# Patient Record
Sex: MALE | Race: WHITE | NOT HISPANIC OR LATINO
[De-identification: names, ages, dates, MRNs, and addresses within clinical notes are randomized per-mention and may not be internally consistent; named-entity substitution may affect disease eponyms.]

---

## 2019-11-25 ENCOUNTER — APPOINTMENT (OUTPATIENT)
Dept: HEART AND VASCULAR | Facility: CLINIC | Age: 49
End: 2019-11-25
Payer: COMMERCIAL

## 2019-11-25 ENCOUNTER — NON-APPOINTMENT (OUTPATIENT)
Age: 49
End: 2019-11-25

## 2019-11-25 VITALS
BODY MASS INDEX: 26.44 KG/M2 | SYSTOLIC BLOOD PRESSURE: 138 MMHG | DIASTOLIC BLOOD PRESSURE: 90 MMHG | HEIGHT: 74 IN | WEIGHT: 206 LBS

## 2019-11-25 DIAGNOSIS — R00.2 PALPITATIONS: ICD-10-CM

## 2019-11-25 PROCEDURE — 36415 COLL VENOUS BLD VENIPUNCTURE: CPT

## 2019-11-25 PROCEDURE — 99205 OFFICE O/P NEW HI 60 MIN: CPT | Mod: 25

## 2019-11-25 PROCEDURE — 82043 UR ALBUMIN QUANTITATIVE: CPT | Mod: QW

## 2019-11-25 PROCEDURE — 81005 URINALYSIS: CPT

## 2019-11-25 PROCEDURE — 93000 ELECTROCARDIOGRAM COMPLETE: CPT

## 2019-11-25 NOTE — HISTORY OF PRESENT ILLNESS
[FreeTextEntry1] : \par 48 y/o male with h/o hernia repair who presents for initial evaluation of palpitations and bp. \par \par no cp, sob, syncope, lh, edema, orthopnea, pnd\par \par notes palpitations on/off for past few years infrequently\par for past month notes daily palpitations\par no associated symptoms\par per wife told he snores\par \par Home bp's well controlled\par \par \par \par PMH/PSH:\par groin hernia repair 2016\par \par \par \par ALL:\par nkda\par \par \par MEDS:\par mvi\par \par \par \par SH:\par no tobacco\par social etoh\par no drugs\par \par from [place]\par \par 3 children - healthy\par \par \par \par FH:\par mother -  brain tumor 71\par father - alive, healthy 75\par 2 brothers/2 sisters - alive, healthy

## 2019-11-25 NOTE — PHYSICAL EXAM
[General Appearance - Well Developed] : well developed [General Appearance - In No Acute Distress] : no acute distress [General Appearance - Well Nourished] : well nourished [Normal Jugular Venous V Waves Present] : normal jugular venous V waves present [Normal Oropharynx] : normal oropharynx [Normal Conjunctiva] : the conjunctiva exhibited no abnormalities [Heart Rate And Rhythm] : heart rate and rhythm were normal [Heart Sounds] : normal S1 and S2 [Murmurs] : no murmurs present [Arterial Pulses Normal] : the arterial pulses were normal [Edema] : no peripheral edema present [Respiration, Rhythm And Depth] : normal respiratory rhythm and effort [Auscultation Breath Sounds / Voice Sounds] : lungs were clear to auscultation bilaterally [Bowel Sounds] : normal bowel sounds [Abdomen Tenderness] : non-tender [Abdomen Soft] : soft [Cyanosis, Localized] : no localized cyanosis [Abnormal Walk] : normal gait [Nail Clubbing] : no clubbing of the fingernails [Oriented To Time, Place, And Person] : oriented to person, place, and time [] : no rash [Skin Lesions] : no skin lesions [No Anxiety] : not feeling anxious

## 2019-11-25 NOTE — DISCUSSION/SUMMARY
[Patient] : the patient [___ Week(s)] : [unfilled] week(s) [FreeTextEntry1] : 50 y/o male with h/o hernia repair presents for initial evaluation of palpitations and bp.\par \par -ordered ekg today - nsr, normal intervals, no st/t changes\par -labs ordered today\par -counseled on cvd risk factors\par -48 hour holter monitor to placed\par -echo ordered\par -calcium score ordered today\par -bp home monitor with normal bp's, continue to follow\par -f/up 3 weeks

## 2019-11-26 LAB
ALBUMIN SERPL ELPH-MCNC: 4.5 G/DL
ALP BLD-CCNC: 84 U/L
ALT SERPL-CCNC: 14 U/L
ANION GAP SERPL CALC-SCNC: 16 MMOL/L
APPEARANCE: CLEAR
AST SERPL-CCNC: 14 U/L
BACTERIA: NEGATIVE
BASOPHILS # BLD AUTO: 0.04 K/UL
BASOPHILS NFR BLD AUTO: 0.7 %
BILIRUB SERPL-MCNC: 0.4 MG/DL
BILIRUBIN URINE: NEGATIVE
BLOOD URINE: NORMAL
BUN SERPL-MCNC: 18 MG/DL
CALCIUM SERPL-MCNC: 9.4 MG/DL
CHLORIDE SERPL-SCNC: 105 MMOL/L
CHOLEST SERPL-MCNC: 170 MG/DL
CHOLEST/HDLC SERPL: 3.8 RATIO
CO2 SERPL-SCNC: 23 MMOL/L
COLOR: YELLOW
CREAT SERPL-MCNC: 1.1 MG/DL
CREAT SPEC-SCNC: 155 MG/DL
EOSINOPHIL # BLD AUTO: 0.05 K/UL
EOSINOPHIL NFR BLD AUTO: 0.8 %
ESTIMATED AVERAGE GLUCOSE: 108 MG/DL
GLUCOSE QUALITATIVE U: NEGATIVE
GLUCOSE SERPL-MCNC: 98 MG/DL
HBA1C MFR BLD HPLC: 5.4 %
HCT VFR BLD CALC: 47.7 %
HDLC SERPL-MCNC: 45 MG/DL
HGB BLD-MCNC: 15.1 G/DL
HYALINE CASTS: 0 /LPF
IMM GRANULOCYTES NFR BLD AUTO: 0.3 %
KETONES URINE: NEGATIVE
LDLC SERPL CALC-MCNC: 100 MG/DL
LEUKOCYTE ESTERASE URINE: NEGATIVE
LYMPHOCYTES # BLD AUTO: 0.86 K/UL
LYMPHOCYTES NFR BLD AUTO: 14.6 %
MAGNESIUM SERPL-MCNC: 2.1 MG/DL
MAN DIFF?: NORMAL
MCHC RBC-ENTMCNC: 29.7 PG
MCHC RBC-ENTMCNC: 31.7 GM/DL
MCV RBC AUTO: 93.7 FL
MICROALBUMIN 24H UR DL<=1MG/L-MCNC: <1.2 MG/DL
MICROALBUMIN/CREAT 24H UR-RTO: NORMAL MG/G
MICROSCOPIC-UA: NORMAL
MONOCYTES # BLD AUTO: 0.24 K/UL
MONOCYTES NFR BLD AUTO: 4.1 %
NEUTROPHILS # BLD AUTO: 4.69 K/UL
NEUTROPHILS NFR BLD AUTO: 79.5 %
NITRITE URINE: NEGATIVE
PH URINE: 5.5
PLATELET # BLD AUTO: 231 K/UL
POTASSIUM SERPL-SCNC: 3.8 MMOL/L
PROT SERPL-MCNC: 7.4 G/DL
PROTEIN URINE: NEGATIVE
RBC # BLD: 5.09 M/UL
RBC # FLD: 13 %
RED BLOOD CELLS URINE: 7 /HPF
SODIUM SERPL-SCNC: 144 MMOL/L
SPECIFIC GRAVITY URINE: 1.02
SQUAMOUS EPITHELIAL CELLS: 0 /HPF
TRIGL SERPL-MCNC: 123 MG/DL
TSH SERPL-ACNC: 1.62 UIU/ML
UROBILINOGEN URINE: NORMAL
WBC # FLD AUTO: 5.9 K/UL
WHITE BLOOD CELLS URINE: 1 /HPF

## 2019-12-05 ENCOUNTER — FORM ENCOUNTER (OUTPATIENT)
Age: 49
End: 2019-12-05

## 2019-12-06 ENCOUNTER — OUTPATIENT (OUTPATIENT)
Dept: OUTPATIENT SERVICES | Facility: HOSPITAL | Age: 49
LOS: 1 days | End: 2019-12-06
Payer: COMMERCIAL

## 2019-12-06 ENCOUNTER — APPOINTMENT (OUTPATIENT)
Dept: CT IMAGING | Facility: HOSPITAL | Age: 49
End: 2019-12-06
Payer: COMMERCIAL

## 2019-12-06 PROCEDURE — 75571 CT HRT W/O DYE W/CA TEST: CPT

## 2019-12-06 PROCEDURE — 93306 TTE W/DOPPLER COMPLETE: CPT | Mod: 26

## 2019-12-06 PROCEDURE — 93306 TTE W/DOPPLER COMPLETE: CPT

## 2019-12-06 PROCEDURE — 75571 CT HRT W/O DYE W/CA TEST: CPT | Mod: 26

## 2019-12-18 ENCOUNTER — APPOINTMENT (OUTPATIENT)
Dept: CARDIOTHORACIC SURGERY | Facility: CLINIC | Age: 49
End: 2019-12-18
Payer: COMMERCIAL

## 2019-12-18 VITALS
TEMPERATURE: 97.8 F | DIASTOLIC BLOOD PRESSURE: 83 MMHG | OXYGEN SATURATION: 98 % | HEART RATE: 72 BPM | SYSTOLIC BLOOD PRESSURE: 134 MMHG | BODY MASS INDEX: 26.69 KG/M2 | HEIGHT: 74 IN | WEIGHT: 208 LBS | RESPIRATION RATE: 18 BRPM

## 2019-12-18 DIAGNOSIS — Z78.9 OTHER SPECIFIED HEALTH STATUS: ICD-10-CM

## 2019-12-18 PROCEDURE — 99205 OFFICE O/P NEW HI 60 MIN: CPT

## 2019-12-19 PROBLEM — Z78.9 NON-SMOKER: Status: ACTIVE | Noted: 2019-12-19

## 2019-12-19 PROBLEM — Z78.9 SOCIAL ALCOHOL USE: Status: ACTIVE | Noted: 2019-12-19

## 2019-12-19 NOTE — PHYSICAL EXAM
[General Appearance - Alert] : alert [Outer Ear] : the ears and nose were normal in appearance [General Appearance - In No Acute Distress] : in no acute distress [Sclera] : the sclera and conjunctiva were normal [] : no respiratory distress [Neck Appearance] : the appearance of the neck was normal [Apical Impulse] : the apical impulse was normal [Examination Of The Chest] : the chest was normal in appearance [2+] : right 2+ [No CVA Tenderness] : no ~M costovertebral angle tenderness [Cervical Lymph Nodes Enlarged Posterior Bilaterally] : posterior cervical [Abnormal Walk] : normal gait [Skin Color & Pigmentation] : normal skin color and pigmentation [Cranial Nerves] : cranial nerves 2-12 were intact [Deep Tendon Reflexes (DTR)] : deep tendon reflexes were 2+ and symmetric [Oriented To Time, Place, And Person] : oriented to person, place, and time

## 2019-12-30 NOTE — END OF VISIT
[FreeTextEntry3] : Scribe Attestation:\par I personally scribed for LYDIA MTZ on Dec 18 2019 10:00AM . \par \par \par \par Physician Attestation:\par Documented by CHYNA GARCIA acting as a scribe for LYDIA MTZ 12/19/2019 . \par All medical record entries made by the Scribe were at my, LYDIA MTZ  , direction and personally dictated by me on 12/18/2019 . I have reviewed the chart and agree that the record accurately reflects my personal performance of the history, physical exam, assessment and plan. \par

## 2019-12-30 NOTE — CONSULT LETTER
[Dear  ___] : Dear  [unfilled], [Sincerely,] : Sincerely, [Please see my note below.] : Please see my note below. [FreeTextEntry2] : Nu Clement MD \par [address]\par [address]\[place] [address] \par  [FreeTextEntry1] : I had the pleasure of seeing your patient, SANJUANITA HUIZAR , in my office today. We take a multidisciplinary team approach to patient care and consider you, the referring physician, an extension of our team. We will maintain an open line of communication with you throughout your patient's treatment course.  \par \par Mr. HUIZAR presents for an initial evaluation and management of thoracic aortic aneurysm. I have reviewed all of his  medical records and diagnostic images at the time of his office consultation. I have enclosed a copy for your records. \par \par I have also reviewed the indications for surgery, and used our webpage <<[email or web address]>> to illustrate the aorta and anatomy of the heart. The patient does not meet size criteria for surgical intervention at the time. \par \par Therefore, I have recommended that the patient will require a follow up appointment in 1 year with a gated CTA chest abdomen pelvis to monitor his   aortic pathology. My office will assist the patient with his upcoming appointment and I will update you on his progress at that time.\par \par I have discussed with the patient that we will continue to monitor his  aortic pathology closely at the [facility] at [facility] that encompasses the entire health care system and is one of the largest in the nation at this point.\par \par It is our commitment to provide your patient with the highest quality of advanced therapeutic options. On behalf of the Cardiothoracic Surgery Team, thank you for sending your patient to the [facility] based at [facility].  If there are any questions or concerns, please call me directly at [phone].  \par  [FreeTextEntry3] : \par \par Manuel Iglesias M.D.\par Professor of Cardiovascular and Thoracic Surgery\par Minimally Invasive Valve Surgeon\par Director of Aortic Surgery, [facility]\par Cell: [phone]\par Email: [email or web address] \par \par [facility]:\par [address], [address] Floor, [place] [address]\par Office: [phone]\par Fax: [phone]\par \par [facility]:\par Department of Cardiovascular and Thoracic Surgery\par [address] [place] [address], [address]\par Office: [phone]\par Fax: [phone]\par \par Practice Manager: Ms. Alesha Robison\par Email: [email or web address]\par Phone: [phone]\par \par

## 2019-12-30 NOTE — ASSESSMENT
[FreeTextEntry1] : 49 year old male presents for evaluation and management of aortic root aneurysm.\par \par The patient's medical records and diagnostic images were reviewed at the time of this office visit, and the following recommendation was made. Patient does not meet criteria for surgical intervention at the time and will be entered into the aortic registry surveillance program.\par Plan\par \par 1. Follow up in [facility] in 1 year with a gated CTA chest abdomen pelvis\par 2. Continue medication regimen.\par 3. Follow up with cardiologist and PCP.\par \par

## 2019-12-30 NOTE — HISTORY OF PRESENT ILLNESS
[FreeTextEntry1] : 49 year old male with palpitations presents for evaluation and management of aortic root aneurysm. Patient recently presented to Dr. Chung complaining of intermittent palpitations. Patient was put on a 48hr Holter Monitor (results in [employer]) and started on a beta blocker. Patient states palpitations have seemed to resolved once started on medications. \par \par CT coronaries and chest 12/6/19 revealed:\par Aortic root aneurysm measuring 4.9cm. normal coronaries. \par \par Echocardiogram 12/6/19:\par 1. Normal left and right ventricular size and function. \par 2. No significant valvular disease. The aortic valve is tricuspid with normal structure and function without stenosis. There is no evidence of aortic regurgitation.\par 3. No evidence of pulmonary hypertension. \par 4. Dilated aortic root at 4.3cm. \par 5. No pericardial effusion.\par \par Patient denies any fever, chills, fatigue, syncope, acute chest pain, palpitations, SOB, orthopnea, paroxysmal nocturnal dyspnea, vomiting, abdominal pain, back pain, BRBPR or swelling to legs. Patient denies any family history of aortic aneurysm or dissection.

## 2019-12-30 NOTE — PROCEDURE
[FreeTextEntry1] : Dr. Iglesias reviewed the indications for surgery, and used our webpage [email or web address] <[email or web address]> to illustrate the aorta and anatomy of the heart. Those indications are the following: size greater than 5.0 cm, symptomatic aneurysms, family history of aortic dissection or aneurysm death with a size greater than 4.5 cm, other necessary cardiac procedures such as coronary artery bypass grafting or valvular disorders with an aneurysm greater than 4.5 cm, or connective tissue disorders with an aneurysm size greater than 4.5 cm. The patient does not meet size criteria for surgical intervention at the time.\par \par Dr. Iglesias discussed activity restrictions with the patient, and would advise exercise at a moderate amount with no heavy lifting over one third of body weight, and avoiding heart rates that exceed 140 beats per minute. In addition, every patient should abstain from tobacco abuse and to avoid all illicit drug use, especially stimulants such as cocaine or methamphetamine. Dr. Iglesias also counseled regarding maintaining a healthy heart diet, and losing any excessive weight as this also put undue stress on both the aorta and entire cardiovascular system. First degree family members should be screened for bicuspid valve disease, and ascending aortic aneurysms. \par \par

## 2020-01-02 ENCOUNTER — APPOINTMENT (OUTPATIENT)
Dept: HEART AND VASCULAR | Facility: CLINIC | Age: 50
End: 2020-01-02
Payer: COMMERCIAL

## 2020-01-02 VITALS — TEMPERATURE: 98.9 F

## 2020-01-02 VITALS
HEART RATE: 69 BPM | DIASTOLIC BLOOD PRESSURE: 88 MMHG | OXYGEN SATURATION: 98 % | SYSTOLIC BLOOD PRESSURE: 122 MMHG | WEIGHT: 210 LBS | HEIGHT: 74 IN | BODY MASS INDEX: 26.95 KG/M2

## 2020-01-02 PROCEDURE — 99213 OFFICE O/P EST LOW 20 MIN: CPT

## 2020-01-02 NOTE — DISCUSSION/SUMMARY
[Patient] : the patient [___ Month(s)] : [unfilled] month(s) [FreeTextEntry1] : 50 y/o male with h/o hernia repair presents for initial evaluation of palpitations and bp.\par \par -ekg 11/19 - nsr, normal intervals, no st/t changes\par -Calcium score 12/19: zero, ao root aneurysm 4.9 cm x 4 cm\par -Echo: 12/19: normal lv size/thickness, ef 62%, no wma, trace mr/tr/pr, ao root dilated 4.3 cm\par -Holter - avg hr 68, 308 pvc's, 49 sve's\par -f/up with Dr. Iglesias for CT in 12/2020\par -labs 11/19 reviewed\par -counseled on cvd risk factors\par -continue toprol \par -bp home monitor with normal bp's, continue to follow\par -f/up 4 months\par

## 2020-01-02 NOTE — HISTORY OF PRESENT ILLNESS
[FreeTextEntry1] : 50 y/o male with h/o hernia repair, ao root aneurysm who presents for f/up of palpitations and bp. \par Last seen \par started on BB\par \par Had \par Calcium score : zero, ao root aneurysm 4.9 cm x 4 cm\par Echo: : normal lv size/thickness, ef 62%, no wma, trace mr/tr/pr, ao root dilated 4.3 cm\par Holter - avg hr 68, 308 pvc's, 49 sve's\par referred to Dr. Iglesias - \par \par no cp, sob, syncope, lh, edema, orthopnea, pnd\par \par notes palpitations on/off for past few years infrequently\par for past month notes daily palpitations\par no associated symptoms\par per wife told he snores\par \par Home bp's well controlled\par \par \par \par PMH/PSH:\par groin hernia repair \par ao root aneurysm\par \par \par \par ALL:\par nkda\par \par \par MEDS:\par mvi\par toprol 25 mg qd\par \par \par SH:\par no tobacco\par social etoh\par no drugs\par \par from [place]\par \par 3 children - healthy\par \par \par \par FH:\par mother -  brain tumor 71\par father - alive, healthy 75\par 2 brothers/2 sisters - alive, healthy \par

## 2020-06-19 ENCOUNTER — RESULT REVIEW (OUTPATIENT)
Age: 50
End: 2020-06-19

## 2020-06-24 ENCOUNTER — APPOINTMENT (OUTPATIENT)
Dept: CARDIOTHORACIC SURGERY | Facility: CLINIC | Age: 50
End: 2020-06-24
Payer: COMMERCIAL

## 2020-06-24 PROCEDURE — 99213 OFFICE O/P EST LOW 20 MIN: CPT | Mod: 95

## 2020-06-25 NOTE — REASON FOR VISIT
[Follow-Up: _____] : a [unfilled] follow-up visit [Home] : at home, [unfilled] , at the time of the visit. [Medical Office: ([facility])___] : at the medical office located in

## 2020-06-26 NOTE — END OF VISIT
[FreeTextEntry3] : I, CHYNA GARCIA , am scribing for and in the presence of LYDIA MTZ the following sections: History of present illness, past Medical/family/surgical/family/social history, review of systems, vital signs, physical exam and disposition.\par \par I personally performed the services described in the documentation, reviewed the documentation recorded by the scribe in my presence and it accurately and completely records my words and actions.\par \par

## 2020-06-26 NOTE — DATA REVIEWED
[FreeTextEntry1] : CT coronaries and chest 12/6/19 revealed:\par Aortic root aneurysm measuring 4.9cm. normal coronaries. \par \par Echocardiogram 12/6/19:\par 1. Normal left and right ventricular size and function. \par 2. No significant valvular disease. The aortic valve is tricuspid with normal structure and function without stenosis. There is no evidence of aortic regurgitation.\par 3. No evidence of pulmonary hypertension. \par 4. Dilated aortic root at 4.3cm. \par 5. No pericardial effusion.\par \par CTA 6/19/20: aortic root 4.6 cm. Normal caliber aortic arch, descending thoracic aorta and abdominal aorta. \par

## 2020-06-26 NOTE — HISTORY OF PRESENT ILLNESS
[FreeTextEntry1] : 50 year old male presents for evaluation and management of aortic root aneurysm. \par \par CTA 6/19/20: aortic root 4.6 cm. Normal caliber aortic arch, descending thoracic aorta and abdominal aorta. \par \par Patient denies any fever, chills, fatigue, syncope, acute chest pain, palpitations, SOB, orthopnea, paroxysmal nocturnal dyspnea, vomiting, abdominal pain, back pain, BRBPR or swelling to legs. Patient denies any family history of aortic aneurysm or dissection. \par

## 2020-06-26 NOTE — ASSESSMENT
[FreeTextEntry1] : 50 year old male with palpitations presents for evaluation and management of aortic root aneurysm. \par \par The patient's medical records and diagnostic images were reviewed at the time of this office visit, and the following recommendation was made. Review of the imaging shows aortic pathology has remained stable and does not require surgical intervention at this time.\par \par Plan:\par 1. Continue medication regimen\par 2. Follow up with PCP and cardiologist\par 3. BP control- I have recommended the patient to monitor his blood pressure closely. I have also advised the patient to take daily blood pressures at home and adhere to medication regimen.\par 4. Return to the [facility] in 1 year with an MRA chest\par

## 2020-06-26 NOTE — PROCEDURE
[FreeTextEntry1] : \par Dr. Iglesias discussed activity restrictions with the patient, and would advise exercise at a moderate amount with no heavy lifting over one third of body weight, and avoiding heart rates that exceed 140 beats per minute. Every patient must abstain from tobacco and illicit drug use, especially stimulants such as cocaine or methamphetamine. The patient was also counseled on maintaining a healthy heart diet, and losing any excessive weight as this also put undue stress on both the aorta and entire cardiovascular system. Patient was counseled on the importance of medication adherence for blood pressure control, as hypertension is a significant risk factor associated with aortic dissections. First degree family members should be screened for bicuspid valve disease, and ascending aortic aneurysms.\par \par Patient also was advised to view our educational video prior to this visit regarding aortic pathology, lifestyle modifications, risk factors and procedures, retrieved at [email or web address].\par \par Dr. Iglesias reviewed the indications for surgery, and used our webpage [email or web address] to illustrate the aorta and anatomy of the heart. Those indications are the following: size greater than 5.0 cm, symptomatic aneurysms, family history of aortic dissection or aneurysm death with a size greater than 4.5 cm, other necessary cardiac procedures such as coronary artery bypass grafting or severe valvular disorders with an aneurysm greater than 4.5 cm, or connective tissue disorders with an aneurysm size greater than 4.5 cm. The patient does not meet size criteria for surgical intervention at the time.

## 2020-07-16 ENCOUNTER — NON-APPOINTMENT (OUTPATIENT)
Age: 50
End: 2020-07-16

## 2020-07-16 ENCOUNTER — APPOINTMENT (OUTPATIENT)
Dept: HEART AND VASCULAR | Facility: CLINIC | Age: 50
End: 2020-07-16
Payer: COMMERCIAL

## 2020-07-16 VITALS
WEIGHT: 211 LBS | BODY MASS INDEX: 27.08 KG/M2 | HEART RATE: 59 BPM | HEIGHT: 74 IN | DIASTOLIC BLOOD PRESSURE: 82 MMHG | SYSTOLIC BLOOD PRESSURE: 130 MMHG

## 2020-07-16 VITALS — TEMPERATURE: 98.6 F

## 2020-07-16 DIAGNOSIS — I73.9 PERIPHERAL VASCULAR DISEASE, UNSPECIFIED: ICD-10-CM

## 2020-07-16 PROCEDURE — 99214 OFFICE O/P EST MOD 30 MIN: CPT | Mod: 25

## 2020-07-16 PROCEDURE — 93000 ELECTROCARDIOGRAM COMPLETE: CPT

## 2020-07-16 NOTE — HISTORY OF PRESENT ILLNESS
[FreeTextEntry1] : 51 y/o male with h/o hernia repair, ao root aneurysm, pad who presents for f/up today.\par \par Last seen \par \par Had recent CTA chest \par CTA chest : ao root 4.6 cm, minimal athero calcification of abd aorta, some athero calcification of right iliac artery\par \par started on BB\par \par Had \par Calcium score : zero, ao root aneurysm 4.9 cm x 4 cm\par Echo: : normal lv size/thickness, ef 62%, no wma, trace mr/tr/pr, ao root dilated 4.3 cm\par Holter - avg hr 68, 308 pvc's, 49 sve's\par referred to Dr. Iglesias - \par \par no cp, sob, syncope, lh, edema, orthopnea, pnd\par no further palpitations\par \par \par \par PMH/PSH:\par groin hernia repair \par ao root aneurysm\par pad\par \par \par ALL:\par nkda\par \par \par MEDS:\par mvi\par toprol 25 mg qd\par \par \par SH:\par no tobacco\par social etoh\par no drugs\par \par from [place]\par \par 3 children - healthy\par \par \par \par FH:\par mother -  brain tumor 71\par father - alive, healthy 75\par 2 brothers/2 sisters - alive, healthy \par \par

## 2020-07-16 NOTE — DISCUSSION/SUMMARY
[Patient] : the patient [___ Month(s)] : [unfilled] month(s) [FreeTextEntry1] : 49 y/o male with h/o hernia repair, ao root dilatation, pad presents for f/up today\par \par -CTA chest 6/20: ao root 4.6 cm, minimal athero calcification of abd aorta, some athero calcification of right iliac artery\par -ekg 11/19 - nsr, normal intervals, no st/t changes\par -Calcium score 12/19: zero, ao root aneurysm 4.9 cm x 4 cm\par -Echo: 12/19: normal lv size/thickness, ef 62%, no wma, trace mr/tr/pr, ao root dilated 4.3 cm\par -Holter - avg hr 68, 308 pvc's, 49 sve's\par -f/up with Dr. Iglesias for CT in 2021\par -labs 11/19 reviewed\par -counseled on cvd risk factors\par -continue toprol \par -start asa and lipitor 10 mg qhs given PAD\par -close f/up of BP\par -f/up 3 months for labs/lipids\par

## 2020-08-03 ENCOUNTER — RX RENEWAL (OUTPATIENT)
Age: 50
End: 2020-08-03

## 2020-10-15 ENCOUNTER — APPOINTMENT (OUTPATIENT)
Dept: HEART AND VASCULAR | Facility: CLINIC | Age: 50
End: 2020-10-15
Payer: COMMERCIAL

## 2020-10-15 ENCOUNTER — NON-APPOINTMENT (OUTPATIENT)
Age: 50
End: 2020-10-15

## 2020-10-15 VITALS
HEART RATE: 63 BPM | DIASTOLIC BLOOD PRESSURE: 89 MMHG | BODY MASS INDEX: 26.95 KG/M2 | SYSTOLIC BLOOD PRESSURE: 140 MMHG | HEIGHT: 74 IN | WEIGHT: 210 LBS

## 2020-10-15 VITALS — TEMPERATURE: 98.4 F

## 2020-10-15 PROCEDURE — 82043 UR ALBUMIN QUANTITATIVE: CPT | Mod: QW

## 2020-10-15 PROCEDURE — 36415 COLL VENOUS BLD VENIPUNCTURE: CPT

## 2020-10-15 PROCEDURE — 99214 OFFICE O/P EST MOD 30 MIN: CPT | Mod: 25

## 2020-10-15 PROCEDURE — 81005 URINALYSIS: CPT

## 2020-10-15 PROCEDURE — 93000 ELECTROCARDIOGRAM COMPLETE: CPT

## 2020-10-15 NOTE — DISCUSSION/SUMMARY
[Patient] : the patient [___ Week(s)] : [unfilled] week(s) [FreeTextEntry1] : 51 y/o male with h/o hernia repair, ao root dilatation, pad, htn presents for f/up today\par \par -CTA chest 6/20: ao root 4.6 cm, minimal athero calcification of abd aorta, some athero calcification of right iliac artery\par -ekg ordered today - nsr, normal intervals, no st/t changes\par -ekg  7/20- SB, normal intervals, no st/t changes\par -Calcium score 12/19: zero, ao root aneurysm 4.9 cm x 4 cm\par -Echo: 12/19: normal lv size/thickness, ef 62%, no wma, trace mr/tr/pr, ao root dilated 4.3 cm\par -Holter - avg hr 68, 308 pvc's, 49 sve's\par -f/up with Dr. Iglesias for CT in 2021\par -labs 11/19 reviewed, ordered labs today\par -counseled on cvd risk factors\par -continue toprol but increase to 50 mg qd (if needs further antihtn therapy will consider another agent in future given HR 60) goal bp 110/70 given aneurysm\par -continue asa and lipitor 10 mg qhs given PAD\par -close f/up of BP\par -f/up 3 weeks\par

## 2020-10-15 NOTE — HISTORY OF PRESENT ILLNESS
[FreeTextEntry1] : 49 y/o male with h/o hernia repair, ao root aneurysm, pad, htn who presents for f/up today.\par \par Last seen \par started asa and lipitor\par \par no cp, sob, syncope, lh, edema, orthopnea, pnd, palpitations\par \par CTA chest : ao root 4.6 cm, minimal athero calcification of abd aorta, some athero calcification of right iliac artery\par started on BB\par referred to Dr. Iglesias - \par \par Calcium score : zero, ao root aneurysm 4.9 cm x 4 cm\par Echo: : normal lv size/thickness, ef 62%, no wma, trace mr/tr/pr, ao root dilated 4.3 cm\par Holter - avg hr 68, 308 pvc's, 49 sve's\par \par \par \par \par \par PMH/PSH:\par groin hernia repair \par ao root aneurysm\par pad\par htn\par \par ALL:\par nkda\par \par \par MEDS:\par mvi\par asa 81 mg qd\par lipitor 10 mg qhs\par toprol 25 mg qd\par \par \par SH:\par no tobacco\par social etoh\par no drugs\par \par from [place]\par \par 3 children - healthy\par \par \par \par FH:\par mother -  brain tumor 71\par father - alive, healthy 75\par 2 brothers/2 sisters - alive, healthy \par

## 2020-10-19 LAB
ALBUMIN SERPL ELPH-MCNC: 4.6 G/DL
ALP BLD-CCNC: 89 U/L
ALT SERPL-CCNC: 39 U/L
ANION GAP SERPL CALC-SCNC: 12 MMOL/L
APPEARANCE: CLEAR
AST SERPL-CCNC: 23 U/L
BACTERIA: NEGATIVE
BASOPHILS # BLD AUTO: 0.03 K/UL
BASOPHILS NFR BLD AUTO: 0.6 %
BILIRUB SERPL-MCNC: 0.4 MG/DL
BILIRUBIN URINE: NEGATIVE
BLOOD URINE: NEGATIVE
BUN SERPL-MCNC: 23 MG/DL
CALCIUM SERPL-MCNC: 9.4 MG/DL
CHLORIDE SERPL-SCNC: 102 MMOL/L
CHOLEST SERPL-MCNC: 122 MG/DL
CHOLEST/HDLC SERPL: 2.9 RATIO
CO2 SERPL-SCNC: 26 MMOL/L
COLOR: NORMAL
CREAT SERPL-MCNC: 1.23 MG/DL
CREAT SPEC-SCNC: 61 MG/DL
EOSINOPHIL # BLD AUTO: 0.09 K/UL
EOSINOPHIL NFR BLD AUTO: 1.8 %
ESTIMATED AVERAGE GLUCOSE: 108 MG/DL
GLUCOSE QUALITATIVE U: NEGATIVE
GLUCOSE SERPL-MCNC: 107 MG/DL
HBA1C MFR BLD HPLC: 5.4 %
HCT VFR BLD CALC: 47.2 %
HDLC SERPL-MCNC: 42 MG/DL
HGB BLD-MCNC: 15.1 G/DL
HYALINE CASTS: 0 /LPF
IMM GRANULOCYTES NFR BLD AUTO: 0.4 %
KETONES URINE: NEGATIVE
LDLC SERPL CALC-MCNC: 57 MG/DL
LEUKOCYTE ESTERASE URINE: NEGATIVE
LYMPHOCYTES # BLD AUTO: 1.03 K/UL
LYMPHOCYTES NFR BLD AUTO: 20.9 %
MAGNESIUM SERPL-MCNC: 2.2 MG/DL
MAN DIFF?: NORMAL
MCHC RBC-ENTMCNC: 29.3 PG
MCHC RBC-ENTMCNC: 32 GM/DL
MCV RBC AUTO: 91.5 FL
MICROALBUMIN 24H UR DL<=1MG/L-MCNC: <1.2 MG/DL
MICROALBUMIN/CREAT 24H UR-RTO: NORMAL MG/G
MICROSCOPIC-UA: NORMAL
MONOCYTES # BLD AUTO: 0.23 K/UL
MONOCYTES NFR BLD AUTO: 4.7 %
NEUTROPHILS # BLD AUTO: 3.52 K/UL
NEUTROPHILS NFR BLD AUTO: 71.6 %
NITRITE URINE: NEGATIVE
PH URINE: 5.5
PLATELET # BLD AUTO: 222 K/UL
POTASSIUM SERPL-SCNC: 4.3 MMOL/L
PROT SERPL-MCNC: 7.1 G/DL
PROTEIN URINE: NEGATIVE
RBC # BLD: 5.16 M/UL
RBC # FLD: 12.9 %
RED BLOOD CELLS URINE: 1 /HPF
SARS-COV-2 IGG SERPL IA-ACNC: 0.09 INDEX
SARS-COV-2 IGG SERPL QL IA: NEGATIVE
SODIUM SERPL-SCNC: 140 MMOL/L
SPECIFIC GRAVITY URINE: 1.01
SQUAMOUS EPITHELIAL CELLS: 0 /HPF
TRIGL SERPL-MCNC: 117 MG/DL
TSH SERPL-ACNC: 1.65 UIU/ML
UROBILINOGEN URINE: NORMAL
WBC # FLD AUTO: 4.92 K/UL
WHITE BLOOD CELLS URINE: 0 /HPF

## 2021-02-12 ENCOUNTER — RX RENEWAL (OUTPATIENT)
Age: 51
End: 2021-02-12

## 2021-03-01 ENCOUNTER — APPOINTMENT (OUTPATIENT)
Dept: HEART AND VASCULAR | Facility: CLINIC | Age: 51
End: 2021-03-01
Payer: COMMERCIAL

## 2021-03-01 ENCOUNTER — NON-APPOINTMENT (OUTPATIENT)
Age: 51
End: 2021-03-01

## 2021-03-01 VITALS
TEMPERATURE: 98.2 F | DIASTOLIC BLOOD PRESSURE: 88 MMHG | HEIGHT: 74 IN | HEART RATE: 62 BPM | SYSTOLIC BLOOD PRESSURE: 126 MMHG | BODY MASS INDEX: 26.95 KG/M2 | WEIGHT: 210 LBS

## 2021-03-01 PROCEDURE — 99214 OFFICE O/P EST MOD 30 MIN: CPT | Mod: 25

## 2021-03-01 PROCEDURE — 99072 ADDL SUPL MATRL&STAF TM PHE: CPT

## 2021-03-01 NOTE — HISTORY OF PRESENT ILLNESS
[FreeTextEntry1] : 52 y/o male with h/o hernia repair, ao root aneurysm, pad, htn who presents for f/up today.\par \par Last seen 10/20\par on asa, lipitor, bb\par no cp, sob, syncope, lh, edema, orthopnea, pnd, palpitations\par \par CTA chest : ao root 4.6 cm, minimal athero calcification of abd aorta, some athero calcification of right iliac artery\par \par referred to Dr. Iglesias - next visit for \par \par Calcium score : zero, ao root aneurysm 4.9 cm x 4 cm\par Echo: : normal lv size/thickness, ef 62%, no wma, trace mr/tr/pr, ao root dilated 4.3 cm\par Holter - avg hr 68, 308 pvc's, 49 sve's\par \par \par \par \par \par PMH/PSH:\par groin hernia repair \par ao root aneurysm\par pad\par htn\par \par ALL:\par nkda\par \par \par MEDS:\par mvi\par asa 81 mg qd\par lipitor 10 mg qhs\par toprol 50 mg qd\par \par \par SH:\par no tobacco\par social etoh\par no drugs\par \par from [place]\par \par 3 children - healthy\par \par \par \par FH:\par mother -  brain tumor 71\par father - alive, healthy 75\par 2 brothers/2 sisters - alive, healthy \par \par

## 2021-03-01 NOTE — DISCUSSION/SUMMARY
[Patient] : the patient [___ Month(s)] : [unfilled] month(s) [FreeTextEntry1] : 51 y/o male with h/o hernia repair, ao root dilatation, pad, htn presents for f/up today\par \par -CTA chest 6/20: ao root 4.6 cm, minimal athero calcification of abd aorta, some athero calcification of right iliac artery\par -ekg ordered today - nsr, normal intervals, no st/t changes\par -ekg 7/20- SB, normal intervals, no st/t changes\par -Calcium score 12/19: zero, ao root aneurysm 4.9 cm x 4 cm\par -Echo: 12/19: normal lv size/thickness, ef 62%, no wma, trace mr/tr/pr, ao root dilated 4.3 cm\par -Holter - avg hr 68, 308 pvc's, 49 sve's\par -f/up with Dr. Iglesias for CT in 2021\par -labs 2020 reviewed\par -counseled on cvd risk factors\par -continue toprol  50 mg qd (if needs further antihtn therapy will consider another agent in future given HR 60) goal bp 110/70 given aneurysm\par -continue asa and lipitor 10 mg qhs given PAD\par -close f/up of BP\par -f/up 6 months for htn\par \par I have spent 30 minutes reviewing labs, records, tests and discussing medication for bp\par

## 2021-07-01 ENCOUNTER — RESULT REVIEW (OUTPATIENT)
Age: 51
End: 2021-07-01

## 2021-07-28 ENCOUNTER — APPOINTMENT (OUTPATIENT)
Dept: CARDIOTHORACIC SURGERY | Facility: CLINIC | Age: 51
End: 2021-07-28
Payer: COMMERCIAL

## 2021-07-28 DIAGNOSIS — I71.2 THORACIC AORTIC ANEURYSM, W/OUT RUPTURE: ICD-10-CM

## 2021-07-28 PROCEDURE — 99213 OFFICE O/P EST LOW 20 MIN: CPT | Mod: 95

## 2021-07-29 PROBLEM — I71.2 THORACIC AORTIC ANEURYSM, WITHOUT RUPTURE: Status: ACTIVE | Noted: 2021-07-29

## 2021-09-09 ENCOUNTER — NON-APPOINTMENT (OUTPATIENT)
Age: 51
End: 2021-09-09

## 2021-09-09 ENCOUNTER — APPOINTMENT (OUTPATIENT)
Dept: HEART AND VASCULAR | Facility: CLINIC | Age: 51
End: 2021-09-09
Payer: COMMERCIAL

## 2021-09-09 VITALS
SYSTOLIC BLOOD PRESSURE: 118 MMHG | HEIGHT: 74.5 IN | DIASTOLIC BLOOD PRESSURE: 90 MMHG | OXYGEN SATURATION: 98 % | HEART RATE: 57 BPM | WEIGHT: 218 LBS | BODY MASS INDEX: 27.68 KG/M2

## 2021-09-09 VITALS — TEMPERATURE: 97.7 F

## 2021-09-09 PROCEDURE — 99214 OFFICE O/P EST MOD 30 MIN: CPT | Mod: 25

## 2021-09-09 PROCEDURE — 93000 ELECTROCARDIOGRAM COMPLETE: CPT

## 2021-09-09 PROCEDURE — 36415 COLL VENOUS BLD VENIPUNCTURE: CPT

## 2021-09-09 NOTE — DISCUSSION/SUMMARY
[Patient] : the patient [___ Week(s)] : in [unfilled] week(s) [FreeTextEntry1] : 50 y/o male with h/o hernia repair, ao root dilatation, pad, htn presents for f/up today\par \par -start norvasc 2.5 mg qd\par -ordered ekg today - SB, normal intervals, no st/t changes\par -CTA chest 6/20: ao root 4.6 cm, minimal athero calcification of abd aorta, some athero calcification of right iliac artery\par -MRA chest 7/21: sinus of valsalva 4.0cm, ascending at RPA 2.8 cm\par -ekg 3/21 - nsr, normal intervals, no st/t changes\par -Calcium score 12/19: zero, ao root aneurysm 4.9 cm x 4 cm\par -Echo: 12/19: normal lv size/thickness, ef 62%, no wma, trace mr/tr/pr, ao root dilated 4.3 cm\par -Holter - avg hr 68, 308 pvc's, 49 sve's\par -f/up with Dr. Iglesias for CT in 2023\par -labs 2020 reviewed, labs ordered today\par -counseled on cvd risk factors\par -continue toprol 50 mg qd, goal bp 110/70 given aneurysm\par -continue asa and lipitor 10 mg qhs given PAD\par -close f/up of BP\par -f/up 2-3 weeks for htn\par \par I have spent 30 minutes reviewing labs, records, tests and discussing medication for bp\par

## 2021-09-09 NOTE — HISTORY OF PRESENT ILLNESS
[FreeTextEntry1] : 52 y/o male with h/o hernia repair, ao root aneurysm, pad, htn who presents for f/up today.\par \par Last seen 3/21\par on asa, lipitor, bb\par no cp, sob, syncope, lh, edema, orthopnea, pnd, palpitations\par \par -MRA chest : sinus of valsalva 4.0cm, ascending at RPA 2.8 cm\par CTA chest : ao root 4.6 cm, minimal athero calcification of abd aorta, some athero calcification of right iliac artery\par \par referred to Dr. Iglesias - next visit for \par \par Calcium score : zero, ao root aneurysm 4.9 cm x 4 cm\par Echo: : normal lv size/thickness, ef 62%, no wma, trace mr/tr/pr, ao root dilated 4.3 cm\par Holter - avg hr 68, 308 pvc's, 49 sve's\par \par \par \par \par \par PMH/PSH:\par groin hernia repair \par ao root aneurysm\par pad\par htn\par \par ALL:\par nkda\par \par \par MEDS:\par mvi\par asa 81 mg qd\par lipitor 10 mg qhs\par toprol 50 mg qd\par \par \par SH:\par no tobacco\par social etoh\par no drugs\par \par from [place]\par \par 3 children - healthy\par \par \par \par FH:\par mother -  brain tumor 71\par father - alive, healthy 75\par 2 brothers/2 sisters - alive, healthy \par

## 2021-09-13 LAB
ALBUMIN SERPL ELPH-MCNC: 4.6 G/DL
ALP BLD-CCNC: 97 U/L
ALT SERPL-CCNC: 25 U/L
ANION GAP SERPL CALC-SCNC: 9 MMOL/L
APPEARANCE: CLEAR
AST SERPL-CCNC: 17 U/L
BACTERIA: NEGATIVE
BASOPHILS # BLD AUTO: 0.04 K/UL
BASOPHILS NFR BLD AUTO: 0.9 %
BILIRUB SERPL-MCNC: 0.5 MG/DL
BILIRUBIN URINE: NEGATIVE
BLOOD URINE: NORMAL
BUN SERPL-MCNC: 22 MG/DL
CALCIUM SERPL-MCNC: 9.4 MG/DL
CHLORIDE SERPL-SCNC: 102 MMOL/L
CHOLEST SERPL-MCNC: 115 MG/DL
CO2 SERPL-SCNC: 28 MMOL/L
COLOR: NORMAL
CREAT SERPL-MCNC: 1.17 MG/DL
CREAT SPEC-SCNC: 98 MG/DL
EOSINOPHIL # BLD AUTO: 0.09 K/UL
EOSINOPHIL NFR BLD AUTO: 2.1 %
ESTIMATED AVERAGE GLUCOSE: 111 MG/DL
GLUCOSE QUALITATIVE U: NEGATIVE
GLUCOSE SERPL-MCNC: 100 MG/DL
HBA1C MFR BLD HPLC: 5.5 %
HCT VFR BLD CALC: 47.8 %
HDLC SERPL-MCNC: 43 MG/DL
HGB BLD-MCNC: 15.2 G/DL
HYALINE CASTS: 0 /LPF
IMM GRANULOCYTES NFR BLD AUTO: 0.5 %
KETONES URINE: NEGATIVE
LDLC SERPL CALC-MCNC: 59 MG/DL
LEUKOCYTE ESTERASE URINE: NEGATIVE
LYMPHOCYTES # BLD AUTO: 0.96 K/UL
LYMPHOCYTES NFR BLD AUTO: 22.1 %
MAGNESIUM SERPL-MCNC: 2.2 MG/DL
MAN DIFF?: NORMAL
MCHC RBC-ENTMCNC: 29.1 PG
MCHC RBC-ENTMCNC: 31.8 GM/DL
MCV RBC AUTO: 91.6 FL
MICROALBUMIN 24H UR DL<=1MG/L-MCNC: <1.2 MG/DL
MICROALBUMIN/CREAT 24H UR-RTO: NORMAL MG/G
MICROSCOPIC-UA: NORMAL
MONOCYTES # BLD AUTO: 0.22 K/UL
MONOCYTES NFR BLD AUTO: 5.1 %
NEUTROPHILS # BLD AUTO: 3.02 K/UL
NEUTROPHILS NFR BLD AUTO: 69.3 %
NITRITE URINE: NEGATIVE
NONHDLC SERPL-MCNC: 72 MG/DL
PH URINE: 5.5
PLATELET # BLD AUTO: 217 K/UL
POTASSIUM SERPL-SCNC: 4.6 MMOL/L
PROT SERPL-MCNC: 7.3 G/DL
PROTEIN URINE: NEGATIVE
RBC # BLD: 5.22 M/UL
RBC # FLD: 13 %
RED BLOOD CELLS URINE: 1 /HPF
SODIUM SERPL-SCNC: 139 MMOL/L
SPECIFIC GRAVITY URINE: 1.02
SQUAMOUS EPITHELIAL CELLS: 0 /HPF
TRIGL SERPL-MCNC: 65 MG/DL
TSH SERPL-ACNC: 1.47 UIU/ML
UROBILINOGEN URINE: NORMAL
WBC # FLD AUTO: 4.35 K/UL
WHITE BLOOD CELLS URINE: 0 /HPF

## 2021-09-30 ENCOUNTER — APPOINTMENT (OUTPATIENT)
Dept: HEART AND VASCULAR | Facility: CLINIC | Age: 51
End: 2021-09-30
Payer: COMMERCIAL

## 2021-09-30 VITALS
SYSTOLIC BLOOD PRESSURE: 130 MMHG | HEIGHT: 74.5 IN | WEIGHT: 218 LBS | TEMPERATURE: 96.7 F | HEART RATE: 60 BPM | BODY MASS INDEX: 27.68 KG/M2 | DIASTOLIC BLOOD PRESSURE: 84 MMHG

## 2021-09-30 VITALS — SYSTOLIC BLOOD PRESSURE: 111 MMHG | DIASTOLIC BLOOD PRESSURE: 79 MMHG

## 2021-09-30 PROCEDURE — 99214 OFFICE O/P EST MOD 30 MIN: CPT

## 2021-09-30 NOTE — DISCUSSION/SUMMARY
[Patient] : the patient [___ Month(s)] : in [unfilled] month(s) [FreeTextEntry1] : 50 y/o male with h/o hernia repair, ao root dilatation, pad, htn presents for f/up today\par \par -continue norvasc 2.5 mg qd\par -ekg 9/21 - SB, normal intervals, no st/t changes\par -CTA chest 6/20: ao root 4.6 cm, minimal athero calcification of abd aorta, some athero calcification of right iliac artery\par -MRA chest 7/21: sinus of valsalva 4.0cm, ascending at RPA 2.8 cm\par -ekg 3/21 - nsr, normal intervals, no st/t changes\par -Calcium score 12/19: zero, ao root aneurysm 4.9 cm x 4 cm\par -Echo: 12/19: normal lv size/thickness, ef 62%, no wma, trace mr/tr/pr, ao root dilated 4.3 cm\par -Holter - avg hr 68, 308 pvc's, 49 sve's\par -f/up with Dr. Iglesias for CT in 2023\par -labs 2021 reviewed \par -counseled on cvd risk factors\par -continue toprol 50 mg qd, goal bp 110/70 given aneurysm\par -continue asa and lipitor 10 mg qhs given PAD\par -close f/up of BP\par -f/up 6 months for htn\par \par I have spent 30 minutes reviewing labs, records, tests and discussing medication for bp

## 2021-09-30 NOTE — HISTORY OF PRESENT ILLNESS
[FreeTextEntry1] : \par 50 y/o male with h/o hernia repair, ao root aneurysm, pad, htn who presents for f/up today.\par \par Last seen \par started on norvasc 2.5 mg qd, tolerating well\par on asa, lipitor, bb\par no cp, sob, syncope, lh, edema, orthopnea, pnd, palpitations\par \par -MRA chest : sinus of valsalva 4.0cm, ascending at RPA 2.8 cm\par CTA chest : ao root 4.6 cm, minimal athero calcification of abd aorta, some athero calcification of right iliac artery\par \par referred to Dr. Iglesias - next visit for \par \par Calcium score : zero, ao root aneurysm 4.9 cm x 4 cm\par Echo: : normal lv size/thickness, ef 62%, no wma, trace mr/tr/pr, ao root dilated 4.3 cm\par Holter - avg hr 68, 308 pvc's, 49 sve's\par \par \par \par \par \par PMH/PSH:\par groin hernia repair \par ao root aneurysm\par pad\par htn\par \par ALL:\par nkda\par \par \par MEDS:\par norvasc 2.5 mg qd\par mvi\par asa 81 mg qd\par lipitor 10 mg qhs\par toprol 50 mg qd\par \par \par SH:\par no tobacco\par social etoh\par no drugs\par \par from [place]\par \par 3 children - healthy\par \par \par \par FH:\par mother -  brain tumor 71\par father - alive, healthy 75\par 2 brothers/2 sisters - alive, healthy \par \par

## 2022-01-01 NOTE — PHYSICAL EXAM
INFANT DISCHARGE SUMMARY :    DISPOSITION STATUS NOTE:  Date:2022 Mode: Car Seat Accompanied by:family member Disposition:home  Outcomes achieved per plan of care. Discharged in stable condition. Family aware of [place] state law requiring infants to ride in car safety seats and that there are fines for those who disregard this law.    Birth Weight: 7 lb 15 oz (3600 g)   Discharge Weight: Weight: Weight: 3490 g (05/10/22 1124) -3%  TCB:4.2 (Low) (05/10/22 1124)  Bilirubin No results found for: BILIRUBIN  [place] Screen: Done   [place] Hearing Test Results: Done  Hearing Test Machine: Auditory Brainstem Response (Algo) (05/10/22 1132)   Hearing Test Results: Pass R, Pass L (05/10/22 1132)     Immunizations:   Most Recent Immunizations  Administered Date(s) Administered  • Hepatitis B Child   Most Recent Immunizations   Administered Date(s) Administered   • Hep B, adolescent or pediatric 2022   Pended Date(s) Pended   • Hepatitis B Immune Globulin 2022          Safe Sleep: Reduce your baby's risk of SIDS (Sudden Infant Death Syndrome) by practicing Safe Sleep during naps and at night  -Always place your baby on his/her back in a safety approved crib, bassinet, or portable play area.  DO NOT use a car seat, adult bed, swing, carrier, or similar products for everyday sleep.  -Infants should not share a bed with adults or other children during sleep, the safest place for your baby is in a separate safe sleep space close to the parents' bed.  -Place your baby firm surface covered with a fitted sheet, NEVER on a couch (alone or with a parent), pillow, quilt, or with fluffy materials.  -No pillows, stuffed animals, toys, or crib bumpers.     Car seat Safety:   -It is recommended that children under the age of two should always be in a rear-facing seat that is installed in the back seat.  -Proper fit: Harness straps should lie flat and be snug when clipped.  The  Discharge Call Back    Attempted to contact patient with no answer. Message left.     chest clip should be at armpit level.  Adjust as your baby grows.    Feeding your baby:  - Feed only breast milk or formula to infant unless your doctor directs otherwise (No water, cereal, honey etc).  -Your baby should be fed on cue (wakes up, sucking on hands, turning head with mouth open, and/or then cries).  Your baby will eat every 2-3 hours day and night, or 8-12 times in 24 hours.  Your baby will let you know he/she is full when they detach off nipple (mother's or a bottle), suck less vigorously, or becomes sleepy and relaxed.  -Keep a record of your baby's feedings and diapers just like you did in the hospital until the baby is seen by the pediatrician.    For breastfeeding tips please refer to pg. 32 of the breastfeeding section in your A New Beginning booklet given to you by your caregivers.    Tummy time:  Allow your alert and awake baby to have 30-60 minutes of supervised tummy time each day.    Bathing your Baby:  Babies have sensitive skin that can dry out easily.  Wipe your baby's face with just warm water.  Wash his/her body with a soapy wash cloth until umbilical cord falls off and/or circumcision heals (about 1 week of age) then you can bathe the baby in a tub.    Umbilical Cord:  Keep umbilical cord dry.  Do not apply any medications or alcohol to site.  The cord will fall off in about 1 week.    Circumcision Care:  Wash penis with only warm water.  Avoid wiping with baby wipes as they could sting.  Apply a quarter size of petroleum jelly in your baby's diaper with every diaper change to prevent the sore area of the penis from sticking to the diaper.    Jaundice:    Yellowing of the skin or \"jaundice\" is common in newborns.  The yellow appearance is most noticeable in the eyes and skin and is caused by bilirubin.  Jaundice is normal but can become dangerous if the level of bilirubin is too high.  Your baby's doctor will monitor your infant's bilirubin level and treat it as necessary.  You may  have to bring your infant to the pediatrician for a blood test if bilirubin was high in the hospital.  Call your pediatrician if your baby's skin or eyes become yellow or your baby becomes lethargic (too sleepy).     Safety at Home:    Accidental infant fall can happen; the key to avoid a fall is prevention.  Remember as you are recovering the medications you may be taking may make you more tired.  Keep this in mind when holding your baby.  If you are feeling sleepy or plan on sleeping place your baby in a safe place such as their crib or bassinet.   Babies wiggle, move, and push against things with their feet.  Even these early movements can result in a fall.  Do not leave your baby alone on a changing table, bed, sofa, or chair.  If you cannot hold your baby put them in a safe place such as their crib or playpen.  If you have other children, please be cautious and assist your older children while they are holding baby.  If your child has a serious fall or does not act normally after a fall, call your doctor.    Follow-Up Care:   Please follow-up as scheduled.  Call your doctor or lactation consultant with any questions or concerns.    DO NOT GIVE BABY ANY MEDICATIONS unless directed by your doctor.    Notify your doctor if:  · Fever of 100.0 degrees F or higher axially (under the armpit)  · Constant crying for no apparent reason  · Forceful vomiting (not spitting up)  · Several feedings when infant does not suck  · Redness, bleeding or foul-smelling drainage from around the umbilical cord  · Any unusual rash  · Yellow color of eyes or skin  · Watery runny stools (mucous or blood or foul odor)  · No stool for 48 hours  · Has less than 1 wet diaper per day of life (for example you should see 2 wet diapers on day 2, 3 wet diapers on day 3, etc.)  Babies should have at least 6 wet diapers a day after the baby is 7 days old  · Infant injury (fall from bed or table, dropped or severely shaken)    Special Instructions:  In case you need to call about any of the above:  Take baby's temperature and write it down  Know how much and how many feedings the infant had that day  Note amount, color, consistency of urine and stools  Note any changes in the infant's behavior such as being sleepy, very fussy or less active.   [General Appearance - Well Developed] : well developed [General Appearance - Well Nourished] : well nourished [General Appearance - In No Acute Distress] : no acute distress

## 2022-03-31 ENCOUNTER — APPOINTMENT (OUTPATIENT)
Dept: HEART AND VASCULAR | Facility: CLINIC | Age: 52
End: 2022-03-31
Payer: COMMERCIAL

## 2022-03-31 VITALS
HEART RATE: 72 BPM | TEMPERATURE: 97.4 F | HEIGHT: 74.5 IN | OXYGEN SATURATION: 99 % | DIASTOLIC BLOOD PRESSURE: 80 MMHG | SYSTOLIC BLOOD PRESSURE: 122 MMHG | WEIGHT: 215 LBS | BODY MASS INDEX: 27.3 KG/M2

## 2022-03-31 PROCEDURE — 93000 ELECTROCARDIOGRAM COMPLETE: CPT

## 2022-03-31 PROCEDURE — 99214 OFFICE O/P EST MOD 30 MIN: CPT | Mod: 25

## 2022-03-31 RX ORDER — ASPIRIN ENTERIC COATED TABLETS 81 MG 81 MG/1
81 TABLET, DELAYED RELEASE ORAL DAILY
Qty: 90 | Refills: 3 | Status: ACTIVE | COMMUNITY
Start: 2020-07-16 | End: 1900-01-01

## 2022-03-31 NOTE — HISTORY OF PRESENT ILLNESS
[FreeTextEntry1] : 51 y/o male with h/o hernia repair, ao root aneurysm, pad, htn who presents for f/up today.\par \par Last seen \par  \par on asa, lipitor, bb, norvasc\par no cp, sob, syncope, lh, edema, orthopnea, pnd, palpitations\par \par -MRA chest : sinus of valsalva 4.0cm, ascending at RPA 2.8 cm\par CTA chest : ao root 4.6 cm, minimal athero calcification of abd aorta, some athero calcification of right iliac artery\par \par referred to Dr. Iglesias - next visit for \par \par Calcium score : zero, ao root aneurysm 4.9 cm x 4 cm\par Echo: : normal lv size/thickness, ef 62%, no wma, trace mr/tr/pr, ao root dilated 4.3 cm\par Holter - avg hr 68, 308 pvc's, 49 sve's\par \par \par \par \par \par PMH/PSH:\par groin hernia repair \par ao root aneurysm\par pad\par htn\par \par ALL:\par nkda\par \par \par MEDS:\par norvasc 2.5 mg qd\par mvi\par asa 81 mg qd\par lipitor 10 mg qhs\par toprol 50 mg qd\par \par \par SH:\par no tobacco\par social etoh\par no drugs\par \par from [place]\par \par 3 children - healthy\par \par \par \par FH:\par mother -  brain tumor 71\par father - alive, healthy 75\par 2 brothers/2 sisters - alive, healthy \par \par

## 2022-03-31 NOTE — DISCUSSION/SUMMARY
[Patient] : the patient [___ Month(s)] : in [unfilled] month(s) [FreeTextEntry1] : 51 y/o male with h/o hernia repair, ao root dilatation, pad, htn presents for f/up today\par \par -continue norvasc 2.5 mg qd\par -CTA chest 6/20: ao root 4.6 cm, minimal athero calcification of abd aorta, some athero calcification of right iliac artery\par -MRA chest 7/21: sinus of valsalva 4.0cm, ascending at RPA 2.8 cm\par -ekg ordered today - SB, normal intervals, no st/t changes\par -Calcium score 12/19: zero, ao root aneurysm 4.9 cm x 4 cm\par -Echo: 12/19: normal lv size/thickness, ef 62%, no wma, trace mr/tr/pr, ao root dilated 4.3 cm\par -Holter - avg hr 68, 308 pvc's, 49 sve's\par -f/up with Dr. Iglesias for CT in 2023\par -labs 2021 reviewed \par -counseled on cvd risk factors\par -continue toprol 50 mg qd, goal bp 110/70 given aneurysm\par -continue asa and lipitor 10 mg qhs given PAD\par -close f/up of BP\par -f/up 6 months for htn\par \par I have spent 30 minutes reviewing labs, records, tests and discussing medication for bp

## 2022-10-06 ENCOUNTER — NON-APPOINTMENT (OUTPATIENT)
Age: 52
End: 2022-10-06

## 2022-10-06 ENCOUNTER — APPOINTMENT (OUTPATIENT)
Dept: HEART AND VASCULAR | Facility: CLINIC | Age: 52
End: 2022-10-06

## 2022-10-06 ENCOUNTER — TRANSCRIPTION ENCOUNTER (OUTPATIENT)
Age: 52
End: 2022-10-06

## 2022-10-06 VITALS
HEART RATE: 68 BPM | HEIGHT: 74 IN | OXYGEN SATURATION: 96 % | BODY MASS INDEX: 26.95 KG/M2 | TEMPERATURE: 97.3 F | DIASTOLIC BLOOD PRESSURE: 88 MMHG | WEIGHT: 210 LBS | SYSTOLIC BLOOD PRESSURE: 129 MMHG

## 2022-10-06 VITALS — DIASTOLIC BLOOD PRESSURE: 76 MMHG | SYSTOLIC BLOOD PRESSURE: 124 MMHG

## 2022-10-06 PROCEDURE — 36415 COLL VENOUS BLD VENIPUNCTURE: CPT

## 2022-10-06 PROCEDURE — 99214 OFFICE O/P EST MOD 30 MIN: CPT | Mod: 25

## 2022-10-06 PROCEDURE — 93000 ELECTROCARDIOGRAM COMPLETE: CPT

## 2022-10-06 NOTE — HISTORY OF PRESENT ILLNESS
[FreeTextEntry1] : 51 y/o male with h/o hernia repair, ao root aneurysm, pad, htn who presents for f/up today.\par \par Last seen 3/22\par  \par on asa, lipitor, bb, norvasc\par no cp, sob, syncope, lh, edema, orthopnea, pnd, palpitations\par \par -MRA chest : sinus of valsalva 4.0cm, ascending at RPA 2.8 cm\par CTA chest : ao root 4.6 cm, minimal athero calcification of abd aorta, some athero calcification of right iliac artery\par \par referred to Dr. Iglesias - next visit for \par \par Calcium score : zero, ao root aneurysm 4.9 cm x 4 cm\par Echo: : normal lv size/thickness, ef 62%, no wma, trace mr/tr/pr, ao root dilated 4.3 cm\par Holter - avg hr 68, 308 pvc's, 49 sve's\par \par \par \par \par \par PMH/PSH:\par groin hernia repair \par ao root aneurysm\par pad\par htn\par \par ALL:\par nkda\par \par \par MEDS:\par norvasc 2.5 mg qd\par mvi\par asa 81 mg qd\par lipitor 10 mg qhs\par toprol 50 mg qd\par \par \par SH:\par no tobacco\par social etoh\par no drugs\par \par from [place]\par \par 3 children - healthy\par \par \par \par FH:\par mother -  brain tumor 71\par father - alive, healthy 75\par 2 brothers/2 sisters - alive, healthy \par

## 2022-10-06 NOTE — PHYSICAL EXAM
[Well Developed] : well developed [Well Nourished] : well nourished [No Acute Distress] : no acute distress [Normal S1, S2] : normal S1, S2 [No Murmur] : no murmur [No Rub] : no rub [No Gallop] : no gallop [Normal] : clear lung fields, good air entry, no respiratory distress [Clear Lung Fields] : clear lung fields [Good Air Entry] : good air entry [No Respiratory Distress] : no respiratory distress

## 2022-10-06 NOTE — DISCUSSION/SUMMARY
[Patient] : the patient [___ Month(s)] : in [unfilled] month(s) [EKG obtained to assist in diagnosis and management of assessed problem(s)] : EKG obtained to assist in diagnosis and management of assessed problem(s) [FreeTextEntry1] : 53 y/o male with h/o hernia repair, ao root dilatation, pad, htn presents for f/up today\par \par -continue norvasc 2.5 mg qd\par -CTA chest 6/20: ao root 4.6 cm, minimal athero calcification of abd aorta, some athero calcification of right iliac artery\par -MRA chest 7/21: sinus of valsalva 4.0cm, ascending at RPA 2.8 cm\par -ekg ordered today - nsr, normal intervals, no st/t changes\par -Calcium score 12/19: zero, ao root aneurysm 4.9 cm x 4 cm\par -Echo: 12/19: normal lv size/thickness, ef 62%, no wma, trace mr/tr/pr, ao root dilated 4.3 cm\par -Holter - avg hr 68, 308 pvc's, 49 sve's\par -f/up with Dr. Iglesias for CT in 2023\par -labs 2021 reviewed \par -counseled on cvd risk factors\par -continue toprol 50 mg qd, goal bp 110/70 given aneurysm\par -continue asa and lipitor 10 mg qhs given PAD\par -close f/up of BP\par -f/up 6 months for htn\par \par I have spent 30 minutes reviewing labs, records, tests and discussing medication for bp. \par \par

## 2022-10-07 LAB
ALBUMIN SERPL ELPH-MCNC: 4.6 G/DL
ALP BLD-CCNC: 93 U/L
ALT SERPL-CCNC: 19 U/L
ANION GAP SERPL CALC-SCNC: 10 MMOL/L
APPEARANCE: CLEAR
AST SERPL-CCNC: 15 U/L
BACTERIA: NEGATIVE
BASOPHILS # BLD AUTO: 0.04 K/UL
BASOPHILS NFR BLD AUTO: 1 %
BILIRUB SERPL-MCNC: 0.3 MG/DL
BILIRUBIN URINE: NEGATIVE
BLOOD URINE: NEGATIVE
BUN SERPL-MCNC: 20 MG/DL
CALCIUM SERPL-MCNC: 9.3 MG/DL
CHLORIDE SERPL-SCNC: 105 MMOL/L
CHOLEST SERPL-MCNC: 140 MG/DL
CO2 SERPL-SCNC: 26 MMOL/L
COLOR: NORMAL
CREAT SERPL-MCNC: 1.17 MG/DL
CREAT SPEC-SCNC: 179 MG/DL
EGFR: 75 ML/MIN/1.73M2
EOSINOPHIL # BLD AUTO: 0.1 K/UL
EOSINOPHIL NFR BLD AUTO: 2.4 %
ESTIMATED AVERAGE GLUCOSE: 111 MG/DL
GLUCOSE QUALITATIVE U: NEGATIVE
GLUCOSE SERPL-MCNC: 113 MG/DL
HBA1C MFR BLD HPLC: 5.5 %
HCT VFR BLD CALC: 47.8 %
HDLC SERPL-MCNC: 52 MG/DL
HGB BLD-MCNC: 15.2 G/DL
HYALINE CASTS: 0 /LPF
IMM GRANULOCYTES NFR BLD AUTO: 0.7 %
KETONES URINE: NEGATIVE
LDLC SERPL CALC-MCNC: 77 MG/DL
LEUKOCYTE ESTERASE URINE: NEGATIVE
LYMPHOCYTES # BLD AUTO: 1 K/UL
LYMPHOCYTES NFR BLD AUTO: 23.9 %
MAGNESIUM SERPL-MCNC: 2.1 MG/DL
MAN DIFF?: NORMAL
MCHC RBC-ENTMCNC: 29.3 PG
MCHC RBC-ENTMCNC: 31.8 GM/DL
MCV RBC AUTO: 92.1 FL
MICROALBUMIN 24H UR DL<=1MG/L-MCNC: <1.2 MG/DL
MICROALBUMIN/CREAT 24H UR-RTO: NORMAL MG/G
MICROSCOPIC-UA: NORMAL
MONOCYTES # BLD AUTO: 0.24 K/UL
MONOCYTES NFR BLD AUTO: 5.7 %
NEUTROPHILS # BLD AUTO: 2.78 K/UL
NEUTROPHILS NFR BLD AUTO: 66.3 %
NITRITE URINE: NEGATIVE
NONHDLC SERPL-MCNC: 88 MG/DL
PH URINE: 6
PLATELET # BLD AUTO: 222 K/UL
POTASSIUM SERPL-SCNC: 4.5 MMOL/L
PROT SERPL-MCNC: 7.5 G/DL
PROTEIN URINE: NEGATIVE
RBC # BLD: 5.19 M/UL
RBC # FLD: 12.9 %
RED BLOOD CELLS URINE: 1 /HPF
SODIUM SERPL-SCNC: 141 MMOL/L
SPECIFIC GRAVITY URINE: 1.03
SQUAMOUS EPITHELIAL CELLS: 0 /HPF
TRIGL SERPL-MCNC: 58 MG/DL
TSH SERPL-ACNC: 1.58 UIU/ML
UROBILINOGEN URINE: NORMAL
WBC # FLD AUTO: 4.19 K/UL
WHITE BLOOD CELLS URINE: 0 /HPF

## 2023-02-01 ENCOUNTER — NON-APPOINTMENT (OUTPATIENT)
Age: 53
End: 2023-02-01

## 2023-04-25 ENCOUNTER — RX RENEWAL (OUTPATIENT)
Age: 53
End: 2023-04-25

## 2023-05-11 ENCOUNTER — APPOINTMENT (OUTPATIENT)
Dept: HEART AND VASCULAR | Facility: CLINIC | Age: 53
End: 2023-05-11
Payer: COMMERCIAL

## 2023-05-11 VITALS
SYSTOLIC BLOOD PRESSURE: 132 MMHG | OXYGEN SATURATION: 96 % | TEMPERATURE: 97.1 F | DIASTOLIC BLOOD PRESSURE: 78 MMHG | BODY MASS INDEX: 26.18 KG/M2 | HEIGHT: 74 IN | HEART RATE: 76 BPM | WEIGHT: 204 LBS

## 2023-05-11 PROCEDURE — 93000 ELECTROCARDIOGRAM COMPLETE: CPT

## 2023-05-11 PROCEDURE — 99214 OFFICE O/P EST MOD 30 MIN: CPT | Mod: 25

## 2023-05-11 NOTE — HISTORY OF PRESENT ILLNESS
[FreeTextEntry1] : 54 y/o male with h/o hernia repair, ao root aneurysm, pad, htn who presents for f/up today.\par \par Last seen 10/22\par  \par on asa, lipitor, bb, norvasc\par no cp, sob, syncope, lh, edema, orthopnea, pnd, palpitations\par \par -MRA chest : sinus of valsalva 4.0cm, ascending at RPA 2.8 cm\par CTA chest : ao root 4.6 cm, minimal athero calcification of abd aorta, some athero calcification of right iliac artery\par \par referred to Dr. Iglesias - next visit for \par \par Calcium score : zero, ao root aneurysm 4.9 cm x 4 cm\par Echo: : normal lv size/thickness, ef 62%, no wma, trace mr/tr/pr, ao root dilated 4.3 cm\par Holter - avg hr 68, 308 pvc's, 49 sve's\par \par \par \par \par \par PMH/PSH:\par groin hernia repair \par ao root aneurysm\par pad\par htn\par \par ALL:\par nkda\par \par \par MEDS:\par norvasc 2.5 mg qd\par mvi\par asa 81 mg qd\par lipitor 10 mg qhs\par toprol 50 mg qd\par \par \par SH:\par no tobacco\par social etoh\par no drugs\par \par from [place]\par \par 3 children - healthy\par \par \par \par FH:\par mother -  brain tumor 71\par father - alive, healthy 70's\par 2 brothers/2 sisters - alive, healthy \par \par

## 2023-05-11 NOTE — DISCUSSION/SUMMARY
[Patient] : the patient [___ Month(s)] : in [unfilled] month(s) [EKG obtained to assist in diagnosis and management of assessed problem(s)] : EKG obtained to assist in diagnosis and management of assessed problem(s) [FreeTextEntry1] : 52 y/o male with h/o hernia repair, ao root dilatation, pad, htn presents for f/up today\par \par -continue norvasc 2.5 mg qd\par -CTA chest 6/20: ao root 4.6 cm, minimal athero calcification of abd aorta, some athero calcification of right iliac artery\par -MRA chest 7/21: sinus of valsalva 4.0cm, ascending at RPA 2.8 cm\par -ekg ordered today - SB, normal intervals, no st/t changes\par -Calcium score 12/19: zero, ao root aneurysm 4.9 cm x 4 cm\par -Echo: 12/19: normal lv size/thickness, ef 62%, no wma, trace mr/tr/pr, ao root dilated 4.3 cm\par -Holter - avg hr 68, 308 pvc's, 49 sve's\par -f/up with Dr. Iglesias for CT in 2023\par -labs 2022 reviewed \par -counseled on cvd risk factors\par -continue toprol 50 mg qd, goal bp 110/70 given aneurysm\par -continue asa and lipitor 10 mg qhs given PAD\par -close f/up of BP\par -f/up 6 months for htn\par \par I have spent 30 minutes reviewing labs, records, tests and discussing medication for bp. \par \par

## 2023-08-16 ENCOUNTER — RX RENEWAL (OUTPATIENT)
Age: 53
End: 2023-08-16

## 2023-08-30 ENCOUNTER — NON-APPOINTMENT (OUTPATIENT)
Age: 53
End: 2023-08-30

## 2023-09-01 ENCOUNTER — RESULT REVIEW (OUTPATIENT)
Age: 53
End: 2023-09-01

## 2023-09-20 ENCOUNTER — APPOINTMENT (OUTPATIENT)
Dept: CARDIOTHORACIC SURGERY | Facility: CLINIC | Age: 53
End: 2023-09-20
Payer: COMMERCIAL

## 2023-09-20 PROCEDURE — 99213 OFFICE O/P EST LOW 20 MIN: CPT | Mod: 95

## 2023-11-09 ENCOUNTER — APPOINTMENT (OUTPATIENT)
Dept: HEART AND VASCULAR | Facility: CLINIC | Age: 53
End: 2023-11-09
Payer: COMMERCIAL

## 2023-11-09 VITALS
DIASTOLIC BLOOD PRESSURE: 78 MMHG | HEIGHT: 74 IN | OXYGEN SATURATION: 98 % | BODY MASS INDEX: 27.34 KG/M2 | HEART RATE: 62 BPM | SYSTOLIC BLOOD PRESSURE: 117 MMHG | TEMPERATURE: 97 F | WEIGHT: 213 LBS

## 2023-11-09 PROCEDURE — 93000 ELECTROCARDIOGRAM COMPLETE: CPT

## 2023-11-09 PROCEDURE — 36415 COLL VENOUS BLD VENIPUNCTURE: CPT

## 2023-11-09 PROCEDURE — 99214 OFFICE O/P EST MOD 30 MIN: CPT | Mod: 25

## 2023-11-11 LAB
ALBUMIN SERPL ELPH-MCNC: 4.4 G/DL
ALP BLD-CCNC: 84 U/L
ALT SERPL-CCNC: 20 U/L
ANION GAP SERPL CALC-SCNC: 10 MMOL/L
APO LP(A) SERPL-MCNC: 14.6 NMOL/L
APPEARANCE: CLEAR
AST SERPL-CCNC: 15 U/L
BACTERIA: NEGATIVE /HPF
BASOPHILS # BLD AUTO: 0.04 K/UL
BASOPHILS NFR BLD AUTO: 1 %
BILIRUB SERPL-MCNC: 0.6 MG/DL
BILIRUBIN URINE: NEGATIVE
BLOOD URINE: NEGATIVE
BUN SERPL-MCNC: 20 MG/DL
CALCIUM SERPL-MCNC: 9.4 MG/DL
CAST: 0 /LPF
CHLORIDE SERPL-SCNC: 104 MMOL/L
CHOLEST SERPL-MCNC: 134 MG/DL
CO2 SERPL-SCNC: 26 MMOL/L
COLOR: YELLOW
CREAT SERPL-MCNC: 1.21 MG/DL
CREAT SPEC-SCNC: 144 MG/DL
EGFR: 72 ML/MIN/1.73M2
EOSINOPHIL # BLD AUTO: 0.07 K/UL
EOSINOPHIL NFR BLD AUTO: 1.8 %
EPITHELIAL CELLS: 0 /HPF
ESTIMATED AVERAGE GLUCOSE: 111 MG/DL
GLUCOSE QUALITATIVE U: NEGATIVE MG/DL
GLUCOSE SERPL-MCNC: 105 MG/DL
HBA1C MFR BLD HPLC: 5.5 %
HCT VFR BLD CALC: 46.3 %
HDLC SERPL-MCNC: 51 MG/DL
HGB BLD-MCNC: 14.7 G/DL
IMM GRANULOCYTES NFR BLD AUTO: 0.5 %
KETONES URINE: NEGATIVE MG/DL
LDLC SERPL CALC-MCNC: 71 MG/DL
LEUKOCYTE ESTERASE URINE: NEGATIVE
LYMPHOCYTES # BLD AUTO: 1.02 K/UL
LYMPHOCYTES NFR BLD AUTO: 26.5 %
MAGNESIUM SERPL-MCNC: 2.1 MG/DL
MAN DIFF?: NORMAL
MCHC RBC-ENTMCNC: 29.2 PG
MCHC RBC-ENTMCNC: 31.7 GM/DL
MCV RBC AUTO: 92 FL
MICROALBUMIN 24H UR DL<=1MG/L-MCNC: <1.2 MG/DL
MICROALBUMIN/CREAT 24H UR-RTO: NORMAL MG/G
MICROSCOPIC-UA: NORMAL
MONOCYTES # BLD AUTO: 0.27 K/UL
MONOCYTES NFR BLD AUTO: 7 %
NEUTROPHILS # BLD AUTO: 2.43 K/UL
NEUTROPHILS NFR BLD AUTO: 63.2 %
NITRITE URINE: NEGATIVE
NONHDLC SERPL-MCNC: 83 MG/DL
PH URINE: 5.5
PLATELET # BLD AUTO: 219 K/UL
POTASSIUM SERPL-SCNC: 4.7 MMOL/L
PROT SERPL-MCNC: 7.4 G/DL
PROTEIN URINE: NEGATIVE MG/DL
RBC # BLD: 5.03 M/UL
RBC # FLD: 13 %
RED BLOOD CELLS URINE: 1 /HPF
SODIUM SERPL-SCNC: 140 MMOL/L
SPECIFIC GRAVITY URINE: 1.02
TRIGL SERPL-MCNC: 55 MG/DL
TSH SERPL-ACNC: 1.59 UIU/ML
UROBILINOGEN URINE: 0.2 MG/DL
WBC # FLD AUTO: 3.85 K/UL
WHITE BLOOD CELLS URINE: 0 /HPF

## 2023-11-29 ENCOUNTER — RESULT REVIEW (OUTPATIENT)
Age: 53
End: 2023-11-29

## 2023-12-08 ENCOUNTER — APPOINTMENT (OUTPATIENT)
Dept: HEART AND VASCULAR | Facility: CLINIC | Age: 53
End: 2023-12-08
Payer: COMMERCIAL

## 2023-12-08 VITALS
HEIGHT: 74 IN | BODY MASS INDEX: 27.34 KG/M2 | SYSTOLIC BLOOD PRESSURE: 133 MMHG | HEART RATE: 61 BPM | OXYGEN SATURATION: 97 % | DIASTOLIC BLOOD PRESSURE: 88 MMHG | WEIGHT: 213 LBS

## 2023-12-08 PROCEDURE — 93306 TTE W/DOPPLER COMPLETE: CPT

## 2023-12-18 RX ORDER — AMLODIPINE BESYLATE 2.5 MG/1
2.5 TABLET ORAL
Qty: 90 | Refills: 2 | Status: ACTIVE | COMMUNITY
Start: 2021-09-09 | End: 1900-01-01

## 2024-01-05 ENCOUNTER — APPOINTMENT (OUTPATIENT)
Dept: FAMILY MEDICINE | Facility: CLINIC | Age: 54
End: 2024-01-05
Payer: COMMERCIAL

## 2024-01-05 VITALS
SYSTOLIC BLOOD PRESSURE: 140 MMHG | TEMPERATURE: 99.1 F | HEART RATE: 62 BPM | HEIGHT: 74 IN | BODY MASS INDEX: 27.21 KG/M2 | DIASTOLIC BLOOD PRESSURE: 80 MMHG | WEIGHT: 212 LBS | OXYGEN SATURATION: 99 %

## 2024-01-05 DIAGNOSIS — Z80.9 FAMILY HISTORY OF MALIGNANT NEOPLASM, UNSPECIFIED: ICD-10-CM

## 2024-01-05 DIAGNOSIS — Z12.5 ENCOUNTER FOR SCREENING FOR MALIGNANT NEOPLASM OF PROSTATE: ICD-10-CM

## 2024-01-05 DIAGNOSIS — Z80.8 FAMILY HISTORY OF MALIGNANT NEOPLASM OF OTHER ORGANS OR SYSTEMS: ICD-10-CM

## 2024-01-05 DIAGNOSIS — Z00.00 ENCOUNTER FOR GENERAL ADULT MEDICAL EXAMINATION W/OUT ABNORMAL FINDINGS: ICD-10-CM

## 2024-01-05 DIAGNOSIS — I71.21 ANEURYSM OF THE ASCENDING AORTA, WITHOUT RUPTURE: ICD-10-CM

## 2024-01-05 DIAGNOSIS — I36.1 NONRHEUMATIC TRICUSPID (VALVE) INSUFFICIENCY: ICD-10-CM

## 2024-01-05 DIAGNOSIS — I10 ESSENTIAL (PRIMARY) HYPERTENSION: ICD-10-CM

## 2024-01-05 DIAGNOSIS — E55.9 VITAMIN D DEFICIENCY, UNSPECIFIED: ICD-10-CM

## 2024-01-05 PROCEDURE — 99386 PREV VISIT NEW AGE 40-64: CPT

## 2024-01-05 PROCEDURE — 36415 COLL VENOUS BLD VENIPUNCTURE: CPT

## 2024-02-01 DIAGNOSIS — R31.29 OTHER MICROSCOPIC HEMATURIA: ICD-10-CM

## 2024-02-01 LAB
25(OH)D3 SERPL-MCNC: 30.9 NG/ML
ALBUMIN SERPL ELPH-MCNC: 4.4 G/DL
ALP BLD-CCNC: 91 U/L
ALT SERPL-CCNC: 28 U/L
ANION GAP SERPL CALC-SCNC: 12 MMOL/L
APPEARANCE: CLEAR
AST SERPL-CCNC: 16 U/L
BACTERIA UR CULT: NORMAL
BACTERIA: NEGATIVE /HPF
BASOPHILS # BLD AUTO: 0.04 K/UL
BASOPHILS NFR BLD AUTO: 1.1 %
BILIRUB SERPL-MCNC: 0.4 MG/DL
BILIRUBIN URINE: NEGATIVE
BLOOD URINE: ABNORMAL
BUN SERPL-MCNC: 19 MG/DL
CALCIUM SERPL-MCNC: 8.9 MG/DL
CAST: 1 /LPF
CHLORIDE SERPL-SCNC: 101 MMOL/L
CHOLEST SERPL-MCNC: 102 MG/DL
CO2 SERPL-SCNC: 25 MMOL/L
COLOR: YELLOW
CREAT SERPL-MCNC: 1.04 MG/DL
EGFR: 86 ML/MIN/1.73M2
EOSINOPHIL # BLD AUTO: 0.07 K/UL
EOSINOPHIL NFR BLD AUTO: 1.9 %
EPITHELIAL CELLS: 0 /HPF
ESTIMATED AVERAGE GLUCOSE: 111 MG/DL
GLUCOSE QUALITATIVE U: NEGATIVE MG/DL
GLUCOSE SERPL-MCNC: 75 MG/DL
HBA1C MFR BLD HPLC: 5.5 %
HCT VFR BLD CALC: 44 %
HDLC SERPL-MCNC: 34 MG/DL
HGB BLD-MCNC: 14.7 G/DL
IMM GRANULOCYTES NFR BLD AUTO: 0.6 %
KETONES URINE: NEGATIVE MG/DL
LDLC SERPL CALC-MCNC: 51 MG/DL
LEUKOCYTE ESTERASE URINE: NEGATIVE
LYMPHOCYTES # BLD AUTO: 1.08 K/UL
LYMPHOCYTES NFR BLD AUTO: 29.8 %
MAN DIFF?: NORMAL
MCHC RBC-ENTMCNC: 29.6 PG
MCHC RBC-ENTMCNC: 33.4 GM/DL
MCV RBC AUTO: 88.7 FL
MICROSCOPIC-UA: NORMAL
MONOCYTES # BLD AUTO: 0.28 K/UL
MONOCYTES NFR BLD AUTO: 7.7 %
NEUTROPHILS # BLD AUTO: 2.13 K/UL
NEUTROPHILS NFR BLD AUTO: 58.9 %
NITRITE URINE: NEGATIVE
NONHDLC SERPL-MCNC: 68 MG/DL
PH URINE: 6.5
PLATELET # BLD AUTO: 215 K/UL
POTASSIUM SERPL-SCNC: 4 MMOL/L
PROT SERPL-MCNC: 7.1 G/DL
PROTEIN URINE: NEGATIVE MG/DL
PSA FREE FLD-MCNC: 24 %
PSA FREE SERPL-MCNC: 0.51 NG/ML
PSA SERPL-MCNC: 2.16 NG/ML
RBC # BLD: 4.96 M/UL
RBC # FLD: 12.7 %
RED BLOOD CELLS URINE: 7 /HPF
SODIUM SERPL-SCNC: 138 MMOL/L
SPECIFIC GRAVITY URINE: 1.02
TRIGL SERPL-MCNC: 82 MG/DL
TSH SERPL-ACNC: 1.49 UIU/ML
UROBILINOGEN URINE: 1 MG/DL
WBC # FLD AUTO: 3.62 K/UL
WHITE BLOOD CELLS URINE: 0 /HPF

## 2024-02-15 ENCOUNTER — RX RENEWAL (OUTPATIENT)
Age: 54
End: 2024-02-15

## 2024-02-15 PROBLEM — E55.9 VITAMIN D DEFICIENCY: Status: ACTIVE | Noted: 2024-01-05

## 2024-02-15 PROBLEM — Z80.9 FAMILY HISTORY OF MALIGNANT NEOPLASM: Status: ACTIVE | Noted: 2024-01-05

## 2024-02-15 PROBLEM — I71.21 AORTIC ROOT ANEURYSM: Status: ACTIVE | Noted: 2019-12-09

## 2024-02-15 PROBLEM — I36.1 NON-RHEUMATIC TRICUSPID VALVE INSUFFICIENCY: Status: ACTIVE | Noted: 2023-12-11

## 2024-02-15 PROBLEM — Z00.00 ENCOUNTER FOR PREVENTIVE HEALTH EXAMINATION: Status: ACTIVE | Noted: 2019-11-15

## 2024-02-15 PROBLEM — I10 BENIGN ESSENTIAL HYPERTENSION: Status: ACTIVE | Noted: 2020-10-15

## 2024-02-15 PROBLEM — Z80.8 FAMILY HISTORY OF MALIGNANT NEOPLASM OF BRAIN: Status: ACTIVE | Noted: 2024-01-05

## 2024-02-15 PROBLEM — Z12.5 PROSTATE CANCER SCREENING: Status: ACTIVE | Noted: 2024-01-05

## 2024-02-15 RX ORDER — METOPROLOL SUCCINATE 50 MG/1
50 TABLET, EXTENDED RELEASE ORAL DAILY
Qty: 30 | Refills: 5 | Status: ACTIVE | COMMUNITY
Start: 2019-12-09 | End: 1900-01-01

## 2024-02-15 NOTE — HISTORY OF PRESENT ILLNESS
[FreeTextEntry1] : Annual [de-identified] : 55 yo M with HTN, aortic root repair, presenting to establish care and for annual physical. Reports no acute complaints or concerns at this time. Follows with cardiology routinely. Denies any headaches, chest pain, shortness of breath, nausea/vomiting, diarrhea, constipation, changes in vision.

## 2024-02-15 NOTE — HEALTH RISK ASSESSMENT
[Yes] : Yes [Monthly or less (1 pt)] : Monthly or less (1 point) [1 or 2 (0 pts)] : 1 or 2 (0 points) [Never (0 pts)] : Never (0 points) [No falls in past year] : Patient reported no falls in the past year [0] : 2) Feeling down, depressed, or hopeless: Not at all (0) [Fully functional (bathing, dressing, toileting, transferring, walking, feeding)] : Fully functional (bathing, dressing, toileting, transferring, walking, feeding) [Fully functional (using the telephone, shopping, preparing meals, housekeeping, doing laundry, using] : Fully functional and needs no help or supervision to perform IADLs (using the telephone, shopping, preparing meals, housekeeping, doing laundry, using transportation, managing medications and managing finances) [Never] : Never [PHQ-2 Negative - No further assessment needed] : PHQ-2 Negative - No further assessment needed [[id number]] : 0

## 2024-02-15 NOTE — PHYSICAL EXAM
[No Acute Distress] : no acute distress [Well Nourished] : well nourished [Well Developed] : well developed [Normal Sclera/Conjunctiva] : normal sclera/conjunctiva [Well-Appearing] : well-appearing [PERRL] : pupils equal round and reactive to light [EOMI] : extraocular movements intact [Normal Outer Ear/Nose] : the outer ears and nose were normal in appearance [Normal Oropharynx] : the oropharynx was normal [No JVD] : no jugular venous distention [No Lymphadenopathy] : no lymphadenopathy [Supple] : supple [Thyroid Normal, No Nodules] : the thyroid was normal and there were no nodules present [No Respiratory Distress] : no respiratory distress  [No Accessory Muscle Use] : no accessory muscle use [Clear to Auscultation] : lungs were clear to auscultation bilaterally [Normal Rate] : normal rate  [Regular Rhythm] : with a regular rhythm [Normal S1, S2] : normal S1 and S2 [No Murmur] : no murmur heard [No Carotid Bruits] : no carotid bruits [No Abdominal Bruit] : a ~M bruit was not heard ~T in the abdomen [No Varicosities] : no varicosities [Pedal Pulses Present] : the pedal pulses are present [No Edema] : there was no peripheral edema [No Palpable Aorta] : no palpable aorta [No Extremity Clubbing/Cyanosis] : no extremity clubbing/cyanosis [Soft] : abdomen soft [Non Tender] : non-tender [Non-distended] : non-distended [No Masses] : no abdominal mass palpated [No HSM] : no HSM [Normal Bowel Sounds] : normal bowel sounds [Normal Posterior Cervical Nodes] : no posterior cervical lymphadenopathy [Normal Anterior Cervical Nodes] : no anterior cervical lymphadenopathy [No CVA Tenderness] : no CVA  tenderness [No Spinal Tenderness] : no spinal tenderness [No Joint Swelling] : no joint swelling [Grossly Normal Strength/Tone] : grossly normal strength/tone [No Rash] : no rash [No Focal Deficits] : no focal deficits [Coordination Grossly Intact] : coordination grossly intact [Normal Gait] : normal gait [Deep Tendon Reflexes (DTR)] : deep tendon reflexes were 2+ and symmetric [Normal Affect] : the affect was normal [Normal Insight/Judgement] : insight and judgment were intact

## 2024-02-15 NOTE — PLAN
[FreeTextEntry1] : 55 yo M with HTN, aortic root repair, presenting to establish care and for annual physical Labs drawn in office Reviewed medications Reviewed cardiology notes Continue routine follow up All questions answered Recommend colonoscopy given age

## 2024-03-01 ENCOUNTER — APPOINTMENT (OUTPATIENT)
Dept: INTERNAL MEDICINE | Facility: CLINIC | Age: 54
End: 2024-03-01

## 2024-03-03 LAB
APPEARANCE: CLEAR
BACTERIA UR CULT: NORMAL
BACTERIA: NEGATIVE /HPF
BILIRUBIN URINE: NEGATIVE
BLOOD URINE: NEGATIVE
CAST: 0 /LPF
COLOR: YELLOW
EPITHELIAL CELLS: 0 /HPF
GLUCOSE QUALITATIVE U: NEGATIVE MG/DL
KETONES URINE: NEGATIVE MG/DL
LEUKOCYTE ESTERASE URINE: NEGATIVE
MICROSCOPIC-UA: NORMAL
NITRITE URINE: NEGATIVE
PH URINE: 6
PROTEIN URINE: NEGATIVE MG/DL
RED BLOOD CELLS URINE: 2 /HPF
SPECIFIC GRAVITY URINE: 1.02
UROBILINOGEN URINE: 0.2 MG/DL
WHITE BLOOD CELLS URINE: 0 /HPF

## 2024-03-04 ENCOUNTER — NON-APPOINTMENT (OUTPATIENT)
Age: 54
End: 2024-03-04

## 2024-03-07 ENCOUNTER — APPOINTMENT (OUTPATIENT)
Dept: GASTROENTEROLOGY | Facility: CLINIC | Age: 54
End: 2024-03-07
Payer: COMMERCIAL

## 2024-03-07 VITALS — HEIGHT: 74 IN | BODY MASS INDEX: 27.21 KG/M2 | WEIGHT: 212 LBS

## 2024-03-07 DIAGNOSIS — Z12.11 ENCOUNTER FOR SCREENING FOR MALIGNANT NEOPLASM OF COLON: ICD-10-CM

## 2024-03-07 PROCEDURE — 99203 OFFICE O/P NEW LOW 30 MIN: CPT

## 2024-03-07 RX ORDER — SODIUM SULFATE, POTASSIUM SULFATE AND MAGNESIUM SULFATE 1.6; 3.13; 17.5 G/177ML; G/177ML; G/177ML
17.5-3.13-1.6 SOLUTION ORAL
Qty: 2 | Refills: 0 | Status: ACTIVE | COMMUNITY
Start: 2024-03-07 | End: 1900-01-01

## 2024-03-07 NOTE — PHYSICAL EXAM
[Normal] : alert, normal voice/communication, healthy appearing, no acute distress [Sclera] : the sclera and conjunctiva were normal [Abdomen Tenderness] : non-tender [Abdomen Soft] : soft [Oriented To Time, Place, And Person] : oriented to person, place, and time

## 2024-03-07 NOTE — ASSESSMENT
[FreeTextEntry1] : CRC screening with Dr. Bermudez.  - Reviewed importance of adequate colon cleansing prior to colonoscopy. Instructed to contact office if he/she has any questions regarding prep.  - Explained the risks (including but not limited to cardiopulmonary anesthetic complications, bleeding, perforation, aspiration, missed lesions and misidentified sites of lesions - complications that might necessitate hospitalization or surgery), benefits and alternatives of colonoscopy were reviewed with the patient. Preparation for the procedure was reviewed with the patient. The patient was informed that she/he would be given intravenous anesthesia by an anesthesiologist for the procedure. The patient was informed that a family member or friend must drive the patient following recovery from the procedure. Patient understands and agrees, all questions answered. - Holds: None.

## 2024-03-07 NOTE — HISTORY OF PRESENT ILLNESS
[FreeTextEntry1] : Mr. SANJUANITA HUIZAR is a 54-year-old male with a PMH of HTN, HLD, aortic root aneursym (stable unchanged).  PSH: R inguinal hernia repair (2016).   Patient presents for pre-colonoscopy evaluation. Referred to Dr. Bermudez.  First colonoscopy.   Denies change in bowel habits, constipation, N/V/D, rectal bleeding, abdominal pain, bloating, unintentional weight loss, early satiety, dysphagia. Denies heartburn or reflux. Denies bloody or black stools. BM every 1-2 days; soft, brown stools that are easily passed.   Family history: Denies family history of colon cancer or advanced colorectal polyps. Denies family history of IBD or celiac disease.   Social: Smoking history: Never.  Alcohol consumption (# drinks/week): 2-3 per week.  Marijuana use: None.  Other recreational drug use: None.   [de-identified] : MRA chest 9/1/23 4.4x4.6 cm aortic root aneurysm, similar to CTA chest abdomen and pelvis dated 6/19/2020. Ascending aorta: 3.3 x 3.5 cm.

## 2024-04-19 ENCOUNTER — RX RENEWAL (OUTPATIENT)
Age: 54
End: 2024-04-19

## 2024-04-19 RX ORDER — ATORVASTATIN CALCIUM 10 MG/1
10 TABLET, FILM COATED ORAL
Qty: 90 | Refills: 1 | Status: ACTIVE | COMMUNITY
Start: 2020-07-16 | End: 1900-01-01

## 2024-05-09 ENCOUNTER — APPOINTMENT (OUTPATIENT)
Dept: HEART AND VASCULAR | Facility: CLINIC | Age: 54
End: 2024-05-09
Payer: COMMERCIAL

## 2024-05-09 VITALS
OXYGEN SATURATION: 96 % | BODY MASS INDEX: 27.21 KG/M2 | DIASTOLIC BLOOD PRESSURE: 87 MMHG | HEIGHT: 74 IN | TEMPERATURE: 96.8 F | WEIGHT: 212 LBS | HEART RATE: 57 BPM | SYSTOLIC BLOOD PRESSURE: 128 MMHG

## 2024-05-09 VITALS — DIASTOLIC BLOOD PRESSURE: 80 MMHG | SYSTOLIC BLOOD PRESSURE: 118 MMHG

## 2024-05-09 PROCEDURE — 93000 ELECTROCARDIOGRAM COMPLETE: CPT

## 2024-05-09 PROCEDURE — 99214 OFFICE O/P EST MOD 30 MIN: CPT | Mod: 25

## 2024-05-09 NOTE — PHYSICAL EXAM
[Normal] : well developed, well nourished, no acute distress [Well Developed] : well developed [Well Nourished] : well nourished [No Acute Distress] : no acute distress 06-Apr-2018 06-Apr-2018 11:32

## 2024-05-09 NOTE — HISTORY OF PRESENT ILLNESS
[FreeTextEntry1] : 55 y/o male with h/o hernia repair, ao root aneurysm, pad, htn who presents for f/up today.    Last seen   -Calcium score : Cardiac: 1.  The calcium score is 0 Agatston units. 2.  Absence of aortic calcification. Non-cardiac: The visualized aspects of the lungs are unremarkable  -Echo : 1. Left ventricular cavity is normal. Left ventricular wall thickness is normal. Left ventricular systolic function is normal with an ejection fraction of 62 % by Rico's method of disks. There are no regional wall motion abnormalities seen. 2. Normal left ventricular diastolic function. 3. Normal right ventricular cavity size, wall thickness, and systolic function. 4. Normal atria. 5. No significant valvular disease. 6. No pericardial effusion seen. 7. Aortic root at the sinuses of Valsalva is dilated, measuring 4.30 cm (indexed 1.93 cm/m). seen by Dr. Iglesias  -MRA chest 23 4.4x4.6 cm aortic root aneurysm, similar to CTA chest abdomen and pelvis dated 2020. Ascending aorta: 3.3 x 3.5 cm.  on asa, lipitor, bb, norvasc  no cp, sob, syncope, lh, edema, orthopnea, pnd, palpitations    -MRA chest : sinus of valsalva 4.0cm, ascending at RPA 2.8 cm  CTA chest : ao root 4.6 cm, minimal athero calcification of abd aorta, some athero calcification of right iliac artery      Calcium score : zero, ao root aneurysm 4.9 cm x 4 cm  Echo: : normal lv size/thickness, ef 62%, no wma, trace mr/tr/pr, ao root dilated 4.3 cm  Holter - avg hr 68, 308 pvc's, 49 sve's            PMH/PSH:  groin hernia repair 2016  ao root aneurysm  pad  htn    ALL:  nkda      MEDS:  norvasc 2.5 mg qd  mvi  asa 81 mg qd  lipitor 10 mg qhs  toprol 50 mg qd      SH:  no tobacco  social etoh  no drugs    from [place]    3 children - healthy        FH:  mother -  brain tumor 71  father - alive, healthy 70's  2 brothers/2 sisters - alive, healthy

## 2024-05-09 NOTE — DISCUSSION/SUMMARY
[Patient] : the patient [___ Month(s)] : in [unfilled] month(s) [FreeTextEntry1] : 55 y/o male with h/o hernia repair, ao root dilatation, pad, htn presents for f/up today   -continue norvasc 2.5 mg qd  -CTA chest 6/20: ao root 4.6 cm, minimal athero calcification of abd aorta, some athero calcification of right iliac artery  -MRA chest 7/21: sinus of valsalva 4.0cm, ascending at RPA 2.8 cm  -ekg ordered today - SB, normal intervals, no st/t changes  -Calcium score 12/19: zero, ao root aneurysm 4.9 cm x 4 cm  -Echo: 12/19: normal lv size/thickness, ef 62%, no wma, trace mr/tr/pr, ao root dilated 4.3 cm  -Holter - avg hr 68, 308 pvc's, 49 sve's  -f/up with Dr. Iglesias for CT in 2025   -Calcium score 11/23: Cardiac: 1.  The calcium score is 0 Agatston units. 2.  Absence of aortic calcification. Non-cardiac: The visualized aspects of the lungs are unremarkable  -Echo 12/23: 1. Left ventricular cavity is normal. Left ventricular wall thickness is normal. Left ventricular systolic function is normal with an ejection fraction of 62 % by Rico's method of disks. There are no regional wall motion abnormalities seen. 2. Normal left ventricular diastolic function. 3. Normal right ventricular cavity size, wall thickness, and systolic function. 4. Normal atria. 5. No significant valvular disease. 6. No pericardial effusion seen. 7. Aortic root at the sinuses of Valsalva is dilated, measuring 4.30 cm (indexed 1.93 cm/m).   -MRA chest 9/1/23 4.4x4.6 cm aortic root aneurysm, similar to CTA chest abdomen and pelvis dated 6/19/2020. Ascending aorta: 3.3 x 3.5 cm.  -labs 2024 reviewed   -counseled on cvd risk factors  -continue toprol 50 mg qd, goal bp 110/70 given aneurysm  -continue asa and lipitor 10 mg qhs given PAD  -close f/up of BP  -f/up 6 months for htn    I have spent 30 minutes reviewing labs, records, tests and discussing htn, cvd risk factors, pad. [EKG obtained to assist in diagnosis and management of assessed problem(s)] : EKG obtained to assist in diagnosis and management of assessed problem(s)

## 2024-05-17 ENCOUNTER — APPOINTMENT (OUTPATIENT)
Dept: GASTROENTEROLOGY | Facility: HOSPITAL | Age: 54
End: 2024-05-17

## 2024-08-19 ENCOUNTER — RX RENEWAL (OUTPATIENT)
Age: 54
End: 2024-08-19

## 2024-08-23 ENCOUNTER — APPOINTMENT (OUTPATIENT)
Dept: FAMILY MEDICINE | Facility: CLINIC | Age: 54
End: 2024-08-23
Payer: COMMERCIAL

## 2024-08-23 VITALS
DIASTOLIC BLOOD PRESSURE: 78 MMHG | SYSTOLIC BLOOD PRESSURE: 110 MMHG | HEIGHT: 74 IN | HEART RATE: 60 BPM | OXYGEN SATURATION: 98 % | BODY MASS INDEX: 26.95 KG/M2 | WEIGHT: 210 LBS

## 2024-08-23 DIAGNOSIS — I10 ESSENTIAL (PRIMARY) HYPERTENSION: ICD-10-CM

## 2024-08-23 DIAGNOSIS — I73.9 PERIPHERAL VASCULAR DISEASE, UNSPECIFIED: ICD-10-CM

## 2024-08-23 PROCEDURE — 99214 OFFICE O/P EST MOD 30 MIN: CPT

## 2024-08-23 PROCEDURE — 36415 COLL VENOUS BLD VENIPUNCTURE: CPT

## 2024-08-23 PROCEDURE — G2211 COMPLEX E/M VISIT ADD ON: CPT | Mod: NC

## 2024-08-25 LAB
ALBUMIN SERPL ELPH-MCNC: 4.5 G/DL
ALP BLD-CCNC: 90 U/L
ALT SERPL-CCNC: 25 U/L
ANION GAP SERPL CALC-SCNC: 11 MMOL/L
AST SERPL-CCNC: 20 U/L
BILIRUB SERPL-MCNC: 0.4 MG/DL
BUN SERPL-MCNC: 24 MG/DL
CALCIUM SERPL-MCNC: 9.2 MG/DL
CHLORIDE SERPL-SCNC: 105 MMOL/L
CHOLEST SERPL-MCNC: 132 MG/DL
CO2 SERPL-SCNC: 26 MMOL/L
CREAT SERPL-MCNC: 1.17 MG/DL
EGFR: 74 ML/MIN/1.73M2
GLUCOSE SERPL-MCNC: 107 MG/DL
HDLC SERPL-MCNC: 49 MG/DL
LDLC SERPL CALC-MCNC: 67 MG/DL
NONHDLC SERPL-MCNC: 83 MG/DL
POTASSIUM SERPL-SCNC: 4 MMOL/L
PROT SERPL-MCNC: 7 G/DL
SODIUM SERPL-SCNC: 142 MMOL/L
TRIGL SERPL-MCNC: 86 MG/DL

## 2024-08-25 NOTE — PLAN
[FreeTextEntry1] : Labs drawn in office BP well controlled Continue once yearly annual physical and q6 month visits with cardiology All questions answered

## 2024-08-25 NOTE — HISTORY OF PRESENT ILLNESS
[FreeTextEntry8] : 55 yo M with HTN, PAD, aortic root aneurysm, presenting for 6 month follow up. Reports no acute concerns today.

## 2024-08-25 NOTE — HISTORY OF PRESENT ILLNESS
[FreeTextEntry8] : 53 yo M with HTN, PAD, aortic root aneurysm, presenting for 6 month follow up. Reports no acute concerns today.

## 2024-09-18 ENCOUNTER — RX RENEWAL (OUTPATIENT)
Age: 54
End: 2024-09-18

## 2024-10-17 ENCOUNTER — RX RENEWAL (OUTPATIENT)
Age: 54
End: 2024-10-17

## 2024-12-10 ENCOUNTER — APPOINTMENT (OUTPATIENT)
Dept: HEART AND VASCULAR | Facility: CLINIC | Age: 54
End: 2024-12-10
Payer: COMMERCIAL

## 2024-12-10 ENCOUNTER — NON-APPOINTMENT (OUTPATIENT)
Age: 54
End: 2024-12-10

## 2024-12-10 VITALS
WEIGHT: 211 LBS | SYSTOLIC BLOOD PRESSURE: 126 MMHG | DIASTOLIC BLOOD PRESSURE: 81 MMHG | HEIGHT: 74 IN | TEMPERATURE: 97.6 F | HEART RATE: 70 BPM | BODY MASS INDEX: 27.08 KG/M2 | OXYGEN SATURATION: 95 %

## 2024-12-10 PROCEDURE — 93000 ELECTROCARDIOGRAM COMPLETE: CPT

## 2024-12-10 PROCEDURE — 36415 COLL VENOUS BLD VENIPUNCTURE: CPT

## 2024-12-10 PROCEDURE — 99214 OFFICE O/P EST MOD 30 MIN: CPT | Mod: 25

## 2024-12-11 LAB
ALBUMIN SERPL ELPH-MCNC: 4.3 G/DL
ALP BLD-CCNC: 94 U/L
ALT SERPL-CCNC: 22 U/L
ANION GAP SERPL CALC-SCNC: 12 MMOL/L
AST SERPL-CCNC: 20 U/L
BASOPHILS # BLD AUTO: 0.04 K/UL
BASOPHILS NFR BLD AUTO: 1.1 %
BILIRUB SERPL-MCNC: 0.6 MG/DL
BUN SERPL-MCNC: 15 MG/DL
CALCIUM SERPL-MCNC: 9.5 MG/DL
CHLORIDE SERPL-SCNC: 104 MMOL/L
CHOLEST SERPL-MCNC: 125 MG/DL
CO2 SERPL-SCNC: 26 MMOL/L
CREAT SERPL-MCNC: 1.06 MG/DL
CREAT SPEC-SCNC: 168 MG/DL
EGFR: 83 ML/MIN/1.73M2
EOSINOPHIL # BLD AUTO: 0.09 K/UL
EOSINOPHIL NFR BLD AUTO: 2.4 %
ESTIMATED AVERAGE GLUCOSE: 114 MG/DL
GLUCOSE SERPL-MCNC: 106 MG/DL
HBA1C MFR BLD HPLC: 5.6 %
HCT VFR BLD CALC: 45.8 %
HDLC SERPL-MCNC: 50 MG/DL
HGB BLD-MCNC: 14.5 G/DL
IMM GRANULOCYTES NFR BLD AUTO: 0.3 %
LDLC SERPL CALC-MCNC: 63 MG/DL
LYMPHOCYTES # BLD AUTO: 0.81 K/UL
LYMPHOCYTES NFR BLD AUTO: 21.9 %
MAGNESIUM SERPL-MCNC: 2.2 MG/DL
MAN DIFF?: NORMAL
MCHC RBC-ENTMCNC: 29.4 PG
MCHC RBC-ENTMCNC: 31.7 G/DL
MCV RBC AUTO: 92.9 FL
MICROALBUMIN 24H UR DL<=1MG/L-MCNC: <1.2 MG/DL
MICROALBUMIN/CREAT 24H UR-RTO: NORMAL MG/G
MONOCYTES # BLD AUTO: 0.22 K/UL
MONOCYTES NFR BLD AUTO: 5.9 %
NEUTROPHILS # BLD AUTO: 2.53 K/UL
NEUTROPHILS NFR BLD AUTO: 68.4 %
NONHDLC SERPL-MCNC: 75 MG/DL
PLATELET # BLD AUTO: 222 K/UL
POTASSIUM SERPL-SCNC: 4.9 MMOL/L
PROT SERPL-MCNC: 7.4 G/DL
RBC # BLD: 4.93 M/UL
RBC # FLD: 13.5 %
SODIUM SERPL-SCNC: 142 MMOL/L
TRIGL SERPL-MCNC: 55 MG/DL
TSH SERPL-ACNC: 1.89 UIU/ML
WBC # FLD AUTO: 3.7 K/UL

## 2025-03-28 ENCOUNTER — APPOINTMENT (OUTPATIENT)
Dept: FAMILY MEDICINE | Facility: CLINIC | Age: 55
End: 2025-03-28
Payer: COMMERCIAL

## 2025-03-28 ENCOUNTER — NON-APPOINTMENT (OUTPATIENT)
Age: 55
End: 2025-03-28

## 2025-03-28 VITALS
SYSTOLIC BLOOD PRESSURE: 100 MMHG | TEMPERATURE: 98 F | HEIGHT: 74 IN | WEIGHT: 244 LBS | BODY MASS INDEX: 31.32 KG/M2 | HEART RATE: 74 BPM | DIASTOLIC BLOOD PRESSURE: 70 MMHG | OXYGEN SATURATION: 98 %

## 2025-03-28 VITALS
HEART RATE: 59 BPM | DIASTOLIC BLOOD PRESSURE: 80 MMHG | TEMPERATURE: 98.4 F | OXYGEN SATURATION: 95 % | BODY MASS INDEX: 27.34 KG/M2 | SYSTOLIC BLOOD PRESSURE: 130 MMHG | HEIGHT: 74 IN | WEIGHT: 213 LBS

## 2025-03-28 DIAGNOSIS — I36.1 NONRHEUMATIC TRICUSPID (VALVE) INSUFFICIENCY: ICD-10-CM

## 2025-03-28 DIAGNOSIS — E55.9 VITAMIN D DEFICIENCY, UNSPECIFIED: ICD-10-CM

## 2025-03-28 DIAGNOSIS — R00.2 PALPITATIONS: ICD-10-CM

## 2025-03-28 DIAGNOSIS — I10 ESSENTIAL (PRIMARY) HYPERTENSION: ICD-10-CM

## 2025-03-28 DIAGNOSIS — Z00.00 ENCOUNTER FOR GENERAL ADULT MEDICAL EXAMINATION W/OUT ABNORMAL FINDINGS: ICD-10-CM

## 2025-03-28 DIAGNOSIS — Q25.43 CONGENITAL ANEURYSM OF AORTA: ICD-10-CM

## 2025-03-28 DIAGNOSIS — I73.9 PERIPHERAL VASCULAR DISEASE, UNSPECIFIED: ICD-10-CM

## 2025-03-28 DIAGNOSIS — Z12.5 ENCOUNTER FOR SCREENING FOR MALIGNANT NEOPLASM OF PROSTATE: ICD-10-CM

## 2025-03-28 PROCEDURE — 99396 PREV VISIT EST AGE 40-64: CPT

## 2025-03-28 PROCEDURE — 36415 COLL VENOUS BLD VENIPUNCTURE: CPT

## 2025-03-31 LAB
25(OH)D3 SERPL-MCNC: 30.5 NG/ML
ALBUMIN SERPL ELPH-MCNC: 4.5 G/DL
ALP BLD-CCNC: 107 U/L
ALT SERPL-CCNC: 29 U/L
ANION GAP SERPL CALC-SCNC: 10 MMOL/L
APPEARANCE: CLEAR
AST SERPL-CCNC: 19 U/L
BASOPHILS # BLD AUTO: 0.04 K/UL
BASOPHILS NFR BLD AUTO: 0.9 %
BILIRUB SERPL-MCNC: 0.4 MG/DL
BILIRUBIN URINE: NEGATIVE
BLOOD URINE: NEGATIVE
BUN SERPL-MCNC: 20 MG/DL
CALCIUM SERPL-MCNC: 9.5 MG/DL
CHLORIDE SERPL-SCNC: 103 MMOL/L
CHOLEST SERPL-MCNC: 134 MG/DL
CO2 SERPL-SCNC: 27 MMOL/L
COLOR: YELLOW
CREAT SERPL-MCNC: 1.15 MG/DL
EGFRCR SERPLBLD CKD-EPI 2021: 75 ML/MIN/1.73M2
EOSINOPHIL # BLD AUTO: 0.16 K/UL
EOSINOPHIL NFR BLD AUTO: 3.5 %
ESTIMATED AVERAGE GLUCOSE: 114 MG/DL
GLUCOSE QUALITATIVE U: NEGATIVE MG/DL
GLUCOSE SERPL-MCNC: 102 MG/DL
HBA1C MFR BLD HPLC: 5.6 %
HCT VFR BLD CALC: 47.1 %
HDLC SERPL-MCNC: 45 MG/DL
HGB BLD-MCNC: 15.3 G/DL
IMM GRANULOCYTES NFR BLD AUTO: 0.4 %
KETONES URINE: NEGATIVE MG/DL
LDLC SERPL-MCNC: 76 MG/DL
LEUKOCYTE ESTERASE URINE: NEGATIVE
LYMPHOCYTES # BLD AUTO: 1.21 K/UL
LYMPHOCYTES NFR BLD AUTO: 26.4 %
MAN DIFF?: NORMAL
MCHC RBC-ENTMCNC: 29.4 PG
MCHC RBC-ENTMCNC: 32.5 G/DL
MCV RBC AUTO: 90.4 FL
MONOCYTES # BLD AUTO: 0.29 K/UL
MONOCYTES NFR BLD AUTO: 6.3 %
NEUTROPHILS # BLD AUTO: 2.86 K/UL
NEUTROPHILS NFR BLD AUTO: 62.5 %
NITRITE URINE: NEGATIVE
NONHDLC SERPL-MCNC: 89 MG/DL
PH URINE: 6.5
PLATELET # BLD AUTO: 216 K/UL
POTASSIUM SERPL-SCNC: 4.6 MMOL/L
PROT SERPL-MCNC: 7.5 G/DL
PROTEIN URINE: NEGATIVE MG/DL
PSA FREE FLD-MCNC: 18 %
PSA FREE SERPL-MCNC: 0.26 NG/ML
PSA SERPL-MCNC: 1.41 NG/ML
RBC # BLD: 5.21 M/UL
RBC # FLD: 13.2 %
SODIUM SERPL-SCNC: 140 MMOL/L
SPECIFIC GRAVITY URINE: 1.01
TRIGL SERPL-MCNC: 60 MG/DL
TSH SERPL-ACNC: 2.59 UIU/ML
UROBILINOGEN URINE: 0.2 MG/DL
WBC # FLD AUTO: 4.58 K/UL

## 2025-06-10 ENCOUNTER — APPOINTMENT (OUTPATIENT)
Dept: HEART AND VASCULAR | Facility: CLINIC | Age: 55
End: 2025-06-10

## 2025-06-18 ENCOUNTER — RX RENEWAL (OUTPATIENT)
Age: 55
End: 2025-06-18

## 2025-07-30 ENCOUNTER — APPOINTMENT (OUTPATIENT)
Dept: HEART AND VASCULAR | Facility: CLINIC | Age: 55
End: 2025-07-30
Payer: COMMERCIAL

## 2025-07-30 VITALS
WEIGHT: 214 LBS | DIASTOLIC BLOOD PRESSURE: 83 MMHG | TEMPERATURE: 97.8 F | HEART RATE: 69 BPM | BODY MASS INDEX: 27.46 KG/M2 | SYSTOLIC BLOOD PRESSURE: 144 MMHG | OXYGEN SATURATION: 96 % | HEIGHT: 74 IN

## 2025-07-30 VITALS — SYSTOLIC BLOOD PRESSURE: 131 MMHG | DIASTOLIC BLOOD PRESSURE: 82 MMHG

## 2025-07-30 PROCEDURE — 99214 OFFICE O/P EST MOD 30 MIN: CPT | Mod: 25

## 2025-07-30 PROCEDURE — 93000 ELECTROCARDIOGRAM COMPLETE: CPT

## 2025-08-27 ENCOUNTER — APPOINTMENT (OUTPATIENT)
Dept: HEART AND VASCULAR | Facility: CLINIC | Age: 55
End: 2025-08-27
Payer: COMMERCIAL

## 2025-08-27 VITALS
DIASTOLIC BLOOD PRESSURE: 82 MMHG | WEIGHT: 214 LBS | HEART RATE: 58 BPM | TEMPERATURE: 97.6 F | SYSTOLIC BLOOD PRESSURE: 126 MMHG | HEIGHT: 74 IN | BODY MASS INDEX: 27.46 KG/M2 | OXYGEN SATURATION: 96 %

## 2025-08-27 VITALS — SYSTOLIC BLOOD PRESSURE: 124 MMHG | DIASTOLIC BLOOD PRESSURE: 78 MMHG

## 2025-08-27 DIAGNOSIS — I73.9 PERIPHERAL VASCULAR DISEASE, UNSPECIFIED: ICD-10-CM

## 2025-08-27 DIAGNOSIS — I10 ESSENTIAL (PRIMARY) HYPERTENSION: ICD-10-CM

## 2025-08-27 PROCEDURE — 99214 OFFICE O/P EST MOD 30 MIN: CPT

## 2025-09-15 PROBLEM — I71.20 THORACIC AORTIC ANEURYSM WITHOUT RUPTURE: Status: ACTIVE | Noted: 2021-07-29

## 2025-09-15 PROBLEM — Z86.79 HISTORY OF HYPERTENSION: Status: RESOLVED | Noted: 2025-09-15 | Resolved: 2025-09-15

## 2025-09-15 PROBLEM — Z86.39 HISTORY OF HYPERLIPIDEMIA: Status: RESOLVED | Noted: 2025-09-15 | Resolved: 2025-09-15

## 2025-09-18 ENCOUNTER — APPOINTMENT (OUTPATIENT)
Dept: MRI IMAGING | Facility: HOSPITAL | Age: 55
End: 2025-09-18